# Patient Record
Sex: FEMALE | Race: WHITE | NOT HISPANIC OR LATINO
[De-identification: names, ages, dates, MRNs, and addresses within clinical notes are randomized per-mention and may not be internally consistent; named-entity substitution may affect disease eponyms.]

---

## 2017-01-04 ENCOUNTER — NON-APPOINTMENT (OUTPATIENT)
Age: 67
End: 2017-01-04

## 2017-01-04 ENCOUNTER — APPOINTMENT (OUTPATIENT)
Dept: CARDIOLOGY | Facility: CLINIC | Age: 67
End: 2017-01-04

## 2017-01-04 VITALS
WEIGHT: 160 LBS | BODY MASS INDEX: 24.33 KG/M2 | SYSTOLIC BLOOD PRESSURE: 137 MMHG | OXYGEN SATURATION: 98 % | HEART RATE: 57 BPM | DIASTOLIC BLOOD PRESSURE: 85 MMHG

## 2017-01-04 DIAGNOSIS — Z78.9 OTHER SPECIFIED HEALTH STATUS: ICD-10-CM

## 2017-01-13 ENCOUNTER — RX RENEWAL (OUTPATIENT)
Age: 67
End: 2017-01-13

## 2017-02-06 ENCOUNTER — MEDICATION RENEWAL (OUTPATIENT)
Age: 67
End: 2017-02-06

## 2017-08-07 ENCOUNTER — MEDICATION RENEWAL (OUTPATIENT)
Age: 67
End: 2017-08-07

## 2018-05-09 ENCOUNTER — NON-APPOINTMENT (OUTPATIENT)
Age: 68
End: 2018-05-09

## 2018-05-09 ENCOUNTER — APPOINTMENT (OUTPATIENT)
Dept: CARDIOLOGY | Facility: CLINIC | Age: 68
End: 2018-05-09
Payer: MEDICARE

## 2018-05-09 VITALS
BODY MASS INDEX: 24.71 KG/M2 | HEART RATE: 64 BPM | SYSTOLIC BLOOD PRESSURE: 112 MMHG | HEIGHT: 68 IN | DIASTOLIC BLOOD PRESSURE: 60 MMHG | WEIGHT: 163 LBS

## 2018-05-09 DIAGNOSIS — Z00.00 ENCOUNTER FOR GENERAL ADULT MEDICAL EXAMINATION W/OUT ABNORMAL FINDINGS: ICD-10-CM

## 2018-05-09 PROCEDURE — 93000 ELECTROCARDIOGRAM COMPLETE: CPT

## 2018-05-09 PROCEDURE — 99215 OFFICE O/P EST HI 40 MIN: CPT

## 2018-08-13 ENCOUNTER — APPOINTMENT (OUTPATIENT)
Dept: SURGICAL ONCOLOGY | Facility: CLINIC | Age: 68
End: 2018-08-13
Payer: MEDICARE

## 2018-08-13 VITALS
WEIGHT: 163 LBS | OXYGEN SATURATION: 95 % | HEIGHT: 68 IN | HEART RATE: 60 BPM | DIASTOLIC BLOOD PRESSURE: 83 MMHG | BODY MASS INDEX: 24.71 KG/M2 | SYSTOLIC BLOOD PRESSURE: 169 MMHG

## 2018-08-13 PROCEDURE — 99204 OFFICE O/P NEW MOD 45 MIN: CPT

## 2018-08-17 ENCOUNTER — OUTPATIENT (OUTPATIENT)
Dept: OUTPATIENT SERVICES | Facility: HOSPITAL | Age: 68
LOS: 1 days | End: 2018-08-17
Payer: COMMERCIAL

## 2018-08-17 DIAGNOSIS — C43.9 MALIGNANT MELANOMA OF SKIN, UNSPECIFIED: ICD-10-CM

## 2018-08-20 ENCOUNTER — RESULT REVIEW (OUTPATIENT)
Age: 68
End: 2018-08-20

## 2018-08-20 PROCEDURE — 88321 CONSLTJ&REPRT SLD PREP ELSWR: CPT

## 2018-08-27 ENCOUNTER — RX RENEWAL (OUTPATIENT)
Age: 68
End: 2018-08-27

## 2018-09-14 ENCOUNTER — APPOINTMENT (OUTPATIENT)
Dept: PLASTIC SURGERY | Facility: CLINIC | Age: 68
End: 2018-09-14
Payer: MEDICARE

## 2018-09-14 VITALS — WEIGHT: 165 LBS | HEIGHT: 68 IN | BODY MASS INDEX: 25.01 KG/M2

## 2018-09-14 DIAGNOSIS — Z78.9 OTHER SPECIFIED HEALTH STATUS: ICD-10-CM

## 2018-09-14 DIAGNOSIS — I51.9 HEART DISEASE, UNSPECIFIED: ICD-10-CM

## 2018-09-14 DIAGNOSIS — Z85.820 PERSONAL HISTORY OF MALIGNANT MELANOMA OF SKIN: ICD-10-CM

## 2018-09-14 PROCEDURE — 99204 OFFICE O/P NEW MOD 45 MIN: CPT

## 2018-09-17 PROBLEM — Z85.820 HISTORY OF MELANOMA: Status: RESOLVED | Noted: 2018-08-13 | Resolved: 2018-09-17

## 2018-09-17 PROBLEM — Z78.9 CURRENT NON-SMOKER: Status: ACTIVE | Noted: 2018-08-13

## 2018-09-17 PROBLEM — I51.9 HEART PROBLEM: Status: RESOLVED | Noted: 2018-08-13 | Resolved: 2018-09-17

## 2018-09-20 ENCOUNTER — FORM ENCOUNTER (OUTPATIENT)
Age: 68
End: 2018-09-20

## 2018-09-21 ENCOUNTER — OUTPATIENT (OUTPATIENT)
Dept: OUTPATIENT SERVICES | Facility: HOSPITAL | Age: 68
LOS: 1 days | End: 2018-09-21
Payer: MEDICARE

## 2018-09-21 VITALS
RESPIRATION RATE: 18 BRPM | OXYGEN SATURATION: 99 % | WEIGHT: 162.92 LBS | TEMPERATURE: 98 F | SYSTOLIC BLOOD PRESSURE: 130 MMHG | HEART RATE: 70 BPM | HEIGHT: 68 IN | DIASTOLIC BLOOD PRESSURE: 70 MMHG

## 2018-09-21 DIAGNOSIS — D03.9 MELANOMA IN SITU, UNSPECIFIED: ICD-10-CM

## 2018-09-21 DIAGNOSIS — D03.71 MELANOMA IN SITU OF RIGHT LOWER LIMB, INCLUDING HIP: ICD-10-CM

## 2018-09-21 DIAGNOSIS — I48.0 PAROXYSMAL ATRIAL FIBRILLATION: ICD-10-CM

## 2018-09-21 DIAGNOSIS — Z01.818 ENCOUNTER FOR OTHER PREPROCEDURAL EXAMINATION: ICD-10-CM

## 2018-09-21 LAB
ANION GAP SERPL CALC-SCNC: 11 MMOL/L — SIGNIFICANT CHANGE UP (ref 5–17)
BUN SERPL-MCNC: 18 MG/DL — SIGNIFICANT CHANGE UP (ref 7–23)
CALCIUM SERPL-MCNC: 9.8 MG/DL — SIGNIFICANT CHANGE UP (ref 8.4–10.5)
CHLORIDE SERPL-SCNC: 100 MMOL/L — SIGNIFICANT CHANGE UP (ref 96–108)
CO2 SERPL-SCNC: 27 MMOL/L — SIGNIFICANT CHANGE UP (ref 22–31)
CREAT SERPL-MCNC: 0.81 MG/DL — SIGNIFICANT CHANGE UP (ref 0.5–1.3)
GLUCOSE SERPL-MCNC: 97 MG/DL — SIGNIFICANT CHANGE UP (ref 70–99)
HCT VFR BLD CALC: 43.1 % — SIGNIFICANT CHANGE UP (ref 34.5–45)
HGB BLD-MCNC: 13.4 G/DL — SIGNIFICANT CHANGE UP (ref 11.5–15.5)
MCHC RBC-ENTMCNC: 30.2 PG — SIGNIFICANT CHANGE UP (ref 27–34)
MCHC RBC-ENTMCNC: 31.1 GM/DL — LOW (ref 32–36)
MCV RBC AUTO: 97.1 FL — SIGNIFICANT CHANGE UP (ref 80–100)
PLATELET # BLD AUTO: 311 K/UL — SIGNIFICANT CHANGE UP (ref 150–400)
POTASSIUM SERPL-MCNC: 4.2 MMOL/L — SIGNIFICANT CHANGE UP (ref 3.5–5.3)
POTASSIUM SERPL-SCNC: 4.2 MMOL/L — SIGNIFICANT CHANGE UP (ref 3.5–5.3)
RBC # BLD: 4.44 M/UL — SIGNIFICANT CHANGE UP (ref 3.8–5.2)
RBC # FLD: 13.9 % — SIGNIFICANT CHANGE UP (ref 10.3–14.5)
SODIUM SERPL-SCNC: 138 MMOL/L — SIGNIFICANT CHANGE UP (ref 135–145)
WBC # BLD: 7.29 K/UL — SIGNIFICANT CHANGE UP (ref 3.8–10.5)
WBC # FLD AUTO: 7.29 K/UL — SIGNIFICANT CHANGE UP (ref 3.8–10.5)

## 2018-09-21 PROCEDURE — G0463: CPT

## 2018-09-21 PROCEDURE — 80048 BASIC METABOLIC PNL TOTAL CA: CPT

## 2018-09-21 PROCEDURE — 71046 X-RAY EXAM CHEST 2 VIEWS: CPT

## 2018-09-21 PROCEDURE — 85027 COMPLETE CBC AUTOMATED: CPT

## 2018-09-21 PROCEDURE — 71046 X-RAY EXAM CHEST 2 VIEWS: CPT | Mod: 26

## 2018-09-21 RX ORDER — LIDOCAINE HCL 20 MG/ML
0.2 VIAL (ML) INJECTION ONCE
Qty: 0 | Refills: 0 | Status: DISCONTINUED | OUTPATIENT
Start: 2018-10-01 | End: 2018-10-16

## 2018-09-21 RX ORDER — SODIUM CHLORIDE 9 MG/ML
3 INJECTION INTRAMUSCULAR; INTRAVENOUS; SUBCUTANEOUS EVERY 8 HOURS
Qty: 0 | Refills: 0 | Status: DISCONTINUED | OUTPATIENT
Start: 2018-10-01 | End: 2018-10-16

## 2018-09-21 NOTE — H&P PST ADULT - HISTORY OF PRESENT ILLNESS
This is a 67 y/o female This is a 67 y/o female c/o a mole in right shin, seen dermatologist biopsy + melanoma, she presents today for wide excision melanoma right shin, plastics, reconstruction of right shin wound possible local flap, possible skin graft

## 2018-09-21 NOTE — H&P PST ADULT - PROBLEM SELECTOR PLAN 1
wide excision melanoma right shin  plastics  reconstruction of right shin wound   possible local flap  possible skin graft

## 2018-09-21 NOTE — H&P PST ADULT - NSANTHOSAYNRD_GEN_A_CORE
No. SOUMYA screening performed.  STOP BANG Legend: 0-2 = LOW Risk; 3-4 = INTERMEDIATE Risk; 5-8 = HIGH Risk

## 2018-09-21 NOTE — H&P PST ADULT - PMH
Diverticula, colon    H/O irritable bowel syndrome    H/O mitral valve prolapse  echo 2012  Paroxysmal A-fib  2012 went to ER, treated with medication, discharged home  on metoprolol since 2012, afib resolved Diverticula, colon    H/O irritable bowel syndrome    H/O mitral valve prolapse  echo 2012  Paroxysmal A-fib  2012 went to ER, treated with medication, discharged home  on metoprolol since 2012, no recurrence

## 2018-09-21 NOTE — H&P PST ADULT - ATTENDING COMMENTS
68-year-old lady recently diagnosed with a melanoma in situ of her right shin, it will for appropriate excision with obstruction by Dr. Schneider.    Diagnosis and surgical management were reviewed with her in my office any morning of operation.    All questions, consent on chart

## 2018-10-01 ENCOUNTER — RESULT REVIEW (OUTPATIENT)
Age: 68
End: 2018-10-01

## 2018-10-01 ENCOUNTER — OUTPATIENT (OUTPATIENT)
Dept: OUTPATIENT SERVICES | Facility: HOSPITAL | Age: 68
LOS: 1 days | End: 2018-10-01
Payer: MEDICARE

## 2018-10-01 ENCOUNTER — APPOINTMENT (OUTPATIENT)
Dept: SURGICAL ONCOLOGY | Facility: HOSPITAL | Age: 68
End: 2018-10-01

## 2018-10-01 VITALS
DIASTOLIC BLOOD PRESSURE: 72 MMHG | HEART RATE: 53 BPM | RESPIRATION RATE: 18 BRPM | TEMPERATURE: 98 F | WEIGHT: 162.92 LBS | HEIGHT: 68 IN | OXYGEN SATURATION: 98 % | SYSTOLIC BLOOD PRESSURE: 150 MMHG

## 2018-10-01 VITALS
HEART RATE: 58 BPM | OXYGEN SATURATION: 99 % | SYSTOLIC BLOOD PRESSURE: 131 MMHG | RESPIRATION RATE: 18 BRPM | TEMPERATURE: 98 F | DIASTOLIC BLOOD PRESSURE: 68 MMHG

## 2018-10-01 DIAGNOSIS — D03.71 MELANOMA IN SITU OF RIGHT LOWER LIMB, INCLUDING HIP: ICD-10-CM

## 2018-10-01 PROCEDURE — 11603 EXC TR-EXT MAL+MARG 2.1-3 CM: CPT

## 2018-10-01 PROCEDURE — 15220 FTH/GFT FR S/A/L 20 SQ CM/<: CPT

## 2018-10-01 PROCEDURE — 27632 EXC LEG/ANKLE LES SC 3 CM/>: CPT | Mod: RT

## 2018-10-01 PROCEDURE — 88305 TISSUE EXAM BY PATHOLOGIST: CPT

## 2018-10-01 PROCEDURE — 88342 IMHCHEM/IMCYTCHM 1ST ANTB: CPT

## 2018-10-01 PROCEDURE — 88342 IMHCHEM/IMCYTCHM 1ST ANTB: CPT | Mod: 26

## 2018-10-01 PROCEDURE — 88305 TISSUE EXAM BY PATHOLOGIST: CPT | Mod: 26

## 2018-10-01 RX ORDER — SODIUM CHLORIDE 9 MG/ML
1000 INJECTION, SOLUTION INTRAVENOUS
Qty: 0 | Refills: 0 | Status: DISCONTINUED | OUTPATIENT
Start: 2018-10-01 | End: 2018-10-16

## 2018-10-01 RX ORDER — ONDANSETRON 8 MG/1
4 TABLET, FILM COATED ORAL ONCE
Qty: 0 | Refills: 0 | Status: DISCONTINUED | OUTPATIENT
Start: 2018-10-01 | End: 2018-10-16

## 2018-10-01 RX ORDER — CELECOXIB 200 MG/1
200 CAPSULE ORAL ONCE
Qty: 0 | Refills: 0 | Status: DISCONTINUED | OUTPATIENT
Start: 2018-10-01 | End: 2018-10-16

## 2018-10-01 RX ORDER — ACETAMINOPHEN 500 MG
1000 TABLET ORAL ONCE
Qty: 0 | Refills: 0 | Status: COMPLETED | OUTPATIENT
Start: 2018-10-01 | End: 2018-10-01

## 2018-10-01 RX ORDER — CEPHALEXIN 500 MG
1 CAPSULE ORAL
Qty: 18 | Refills: 0
Start: 2018-10-01 | End: 2018-10-06

## 2018-10-01 RX ORDER — CELECOXIB 200 MG/1
200 CAPSULE ORAL ONCE
Qty: 0 | Refills: 0 | Status: COMPLETED | OUTPATIENT
Start: 2018-10-01 | End: 2018-10-01

## 2018-10-01 RX ADMIN — CELECOXIB 200 MILLIGRAM(S): 200 CAPSULE ORAL at 06:05

## 2018-10-01 RX ADMIN — Medication 1000 MILLIGRAM(S): at 06:05

## 2018-10-01 NOTE — BRIEF OPERATIVE NOTE - OPERATION/FINDINGS
Minimum 5 mm margin
right lower shin wound s/p excision of melanoma  left groin wound closed w/ 3-0 vicryl, 3-0 mono, dermabond + steris  right lower shin closed with bolster dressing (4-0 chromic & 2-0 silk)

## 2018-10-01 NOTE — ASU DISCHARGE PLAN (ADULT/PEDIATRIC). - NOTIFY
Fever greater than 101/Numbness, tingling/Increased Irritability or Sluggishness/Bleeding that does not stop/Inability to Tolerate Liquids or Foods/Pain not relieved by Medications/Numbness, color, or temperature change to extremity/Excessive Diarrhea/Unable to Urinate/Swelling that continues/Persistent Nausea and Vomiting

## 2018-10-01 NOTE — ASU DISCHARGE PLAN (ADULT/PEDIATRIC). - ACTIVITY LEVEL
no heavy lifting/elevate extremity/no sports/gym/no exercise sleep with the right leg elevated on 1-2 pillows to reduce swelling/no sports/gym/elevate extremity/no exercise/no heavy lifting

## 2018-10-01 NOTE — ASU DISCHARGE PLAN (ADULT/PEDIATRIC). - BATHING
Keep the right leg DRY until you see Dr. Schneider cover it with a plastic bag if you are showering/shower only

## 2018-10-01 NOTE — ASU DISCHARGE PLAN (ADULT/PEDIATRIC). - NURSING INSTRUCTIONS
Tylenol/Motrin as needed for mild pain;  next doses OK @ 12:00pm.  Percocet as prescribed for stronger pain, dose OK @ 12:00pm.  Elevate right leg on 1-2 pillows while resting or sleeping.  Follow up with MDs as requested, call office for appt.  You may resume any/all preop medications/supplements as before.

## 2018-10-01 NOTE — ASU DISCHARGE PLAN (ADULT/PEDIATRIC). - MEDICATION SUMMARY - MEDICATIONS TO TAKE
I will START or STAY ON the medications listed below when I get home from the hospital:    Percocet 5/325 oral tablet  -- 1 tab(s) by mouth every 6 hours, As Needed -for severe pain MDD:4 tabs   -- Caution federal law prohibits the transfer of this drug to any person other  than the person for whom it was prescribed.  May cause drowsiness.  Alcohol may intensify this effect.  Use care when operating dangerous machinery.  This prescription cannot be refilled.  This product contains acetaminophen.  Do not use  with any other product containing acetaminophen to prevent possible liver damage.  Using more of this medication than prescribed may cause serious breathing problems.    -- Indication: For for severe pain following surgery    aspirin 81 mg oral tablet  -- 1 tab(s) by mouth once a day, continue ASA pre op  -- Indication: For home medications    simvastatin 20 mg oral tablet  -- 1 tab(s) by mouth once a day (at bedtime)  -- Indication: For home medications    metoprolol succinate 25 mg oral tablet, extended release  -- 0.5 tab(s) by mouth once a day  -- Indication: For home medications    Keflex 500 mg oral capsule  -- 1 cap(s) by mouth 3 times a day MDD:3 capsules  -- Finish all this medication unless otherwise directed by prescriber.    -- Indication: For after surgery antibiotics take 3x a day with food and water

## 2018-10-01 NOTE — BRIEF OPERATIVE NOTE - PROCEDURE
<<-----Click on this checkbox to enter Procedure Skin graft of lower extremity  10/01/2018  FTSG from the left groin to reconstruct right leg wound s/p wide excision of melanoma in situ.  Active  BKWON

## 2018-10-01 NOTE — ASU PATIENT PROFILE, ADULT - PMH
Diverticula, colon    H/O irritable bowel syndrome    H/O mitral valve prolapse  echo 2012  Paroxysmal A-fib  2012 went to ER, treated with medication, discharged home  on metoprolol since 2012, no recurrence

## 2018-10-01 NOTE — ASU DISCHARGE PLAN (ADULT/PEDIATRIC). - ITEMS TO FOLLOWUP WITH YOUR PHYSICIAN'S
Initial followup is with plastic surgery in the next 7-10 days.    Dr. Mayes should call with pathology report by October 10, the call will determine subsequent management Follow up with Dr. Schneider next week WEDNESDAY 10/10/18.  Please call for an appt: [phone].    Dr. Mayes should call with pathology report by October 10, the call will determine subsequent management

## 2018-10-01 NOTE — BRIEF OPERATIVE NOTE - POST-OP DX
Melanoma in situ of right lower leg  10/01/2018    Active  Brayan Mayes
Melanoma in situ of right lower leg  10/01/2018    Active  Brayan Mayes

## 2018-10-01 NOTE — ASU DISCHARGE PLAN (ADULT/PEDIATRIC). - SPECIAL INSTRUCTIONS
Keep the right leg wound DRY.  The yellow dressing is called a bolster dressings - DO NOT REMOVE. We will remove it next office visit.  You may unwrap the ACE wrapping to shower; keep it loosely wrapped around the leg when ambulating or   standing to reduce swelling.  Sleep with the leg elevated and keep it elevated as much as you can to reduce swelling.    Take medications as directed, you may take and Advil or Motrin for mild pain.

## 2018-10-01 NOTE — BRIEF OPERATIVE NOTE - PROCEDURE
<<-----Click on this checkbox to enter Procedure Melanoma excision  10/01/2018  Excision of in situ melanoma from right chin with reconstruction by Dr. Jennie GIMENEZ

## 2018-10-08 LAB — SURGICAL PATHOLOGY STUDY: SIGNIFICANT CHANGE UP

## 2018-10-10 ENCOUNTER — APPOINTMENT (OUTPATIENT)
Dept: PLASTIC SURGERY | Facility: CLINIC | Age: 68
End: 2018-10-10
Payer: MEDICARE

## 2018-10-10 VITALS
WEIGHT: 165 LBS | SYSTOLIC BLOOD PRESSURE: 143 MMHG | HEIGHT: 68 IN | BODY MASS INDEX: 25.01 KG/M2 | HEART RATE: 62 BPM | DIASTOLIC BLOOD PRESSURE: 81 MMHG | RESPIRATION RATE: 16 BRPM | OXYGEN SATURATION: 97 %

## 2018-10-10 PROCEDURE — 99024 POSTOP FOLLOW-UP VISIT: CPT

## 2018-10-19 ENCOUNTER — APPOINTMENT (OUTPATIENT)
Dept: PLASTIC SURGERY | Facility: CLINIC | Age: 68
End: 2018-10-19
Payer: MEDICARE

## 2018-10-19 PROCEDURE — 99024 POSTOP FOLLOW-UP VISIT: CPT

## 2018-11-13 ENCOUNTER — APPOINTMENT (OUTPATIENT)
Dept: ORTHOPEDIC SURGERY | Facility: CLINIC | Age: 68
End: 2018-11-13
Payer: MEDICARE

## 2018-11-13 VITALS
WEIGHT: 165 LBS | BODY MASS INDEX: 25.01 KG/M2 | DIASTOLIC BLOOD PRESSURE: 73 MMHG | HEART RATE: 66 BPM | SYSTOLIC BLOOD PRESSURE: 137 MMHG | HEIGHT: 68 IN

## 2018-11-13 PROCEDURE — 99204 OFFICE O/P NEW MOD 45 MIN: CPT | Mod: 25

## 2018-11-13 PROCEDURE — 73564 X-RAY EXAM KNEE 4 OR MORE: CPT | Mod: LT

## 2018-11-13 PROCEDURE — 20610 DRAIN/INJ JOINT/BURSA W/O US: CPT | Mod: LT

## 2018-11-13 RX ADMIN — METHYLPREDNISOLONE ACETATE 2 MG/ML: 40 INJECTION, SUSPENSION INTRALESIONAL; INTRAMUSCULAR; INTRASYNOVIAL; SOFT TISSUE at 00:00

## 2018-11-13 RX ADMIN — LIDOCAINE HYDROCHLORIDE 3 %: 10 INJECTION, SOLUTION EPIDURAL; INFILTRATION; INTRACAUDAL; PERINEURAL at 00:00

## 2018-11-16 ENCOUNTER — APPOINTMENT (OUTPATIENT)
Dept: PLASTIC SURGERY | Facility: CLINIC | Age: 68
End: 2018-11-16
Payer: MEDICARE

## 2018-11-16 PROCEDURE — 99024 POSTOP FOLLOW-UP VISIT: CPT

## 2018-11-19 RX ORDER — UBIDECARENONE/VIT E ACET 100MG-5
CAPSULE ORAL
Refills: 0 | Status: ACTIVE | COMMUNITY

## 2018-11-19 RX ORDER — MULTIVITAMIN
TABLET ORAL
Refills: 0 | Status: ACTIVE | COMMUNITY

## 2018-12-17 ENCOUNTER — APPOINTMENT (OUTPATIENT)
Dept: ORTHOPEDIC SURGERY | Facility: CLINIC | Age: 68
End: 2018-12-17
Payer: MEDICARE

## 2018-12-17 VITALS
SYSTOLIC BLOOD PRESSURE: 154 MMHG | BODY MASS INDEX: 25.16 KG/M2 | WEIGHT: 166 LBS | HEART RATE: 57 BPM | HEIGHT: 68 IN | DIASTOLIC BLOOD PRESSURE: 79 MMHG

## 2018-12-17 DIAGNOSIS — M17.12 UNILATERAL PRIMARY OSTEOARTHRITIS, LEFT KNEE: ICD-10-CM

## 2018-12-17 PROCEDURE — 99213 OFFICE O/P EST LOW 20 MIN: CPT

## 2018-12-19 PROBLEM — M17.12 PRIMARY OSTEOARTHRITIS OF LEFT KNEE: Status: ACTIVE | Noted: 2018-11-13

## 2018-12-20 RX ORDER — METHYLPRED ACET/NACL,ISO-OS/PF 40 MG/ML
40 VIAL (ML) INJECTION
Qty: 1 | Refills: 0 | Status: COMPLETED | OUTPATIENT
Start: 2018-11-13

## 2018-12-20 RX ORDER — LIDOCAINE HYDROCHLORIDE 10 MG/ML
1 INJECTION, SOLUTION INFILTRATION; PERINEURAL
Refills: 0 | Status: COMPLETED | OUTPATIENT
Start: 2018-11-13

## 2018-12-28 ENCOUNTER — APPOINTMENT (OUTPATIENT)
Dept: PLASTIC SURGERY | Facility: CLINIC | Age: 68
End: 2018-12-28
Payer: MEDICARE

## 2018-12-28 DIAGNOSIS — D03.71 MELANOMA IN SITU OF RIGHT LOWER LIMB, INCLUDING HIP: ICD-10-CM

## 2018-12-28 PROCEDURE — 99024 POSTOP FOLLOW-UP VISIT: CPT

## 2018-12-29 PROBLEM — D03.71 MELANOMA IN SITU OF RIGHT LOWER LEG: Status: ACTIVE | Noted: 2018-08-12

## 2019-08-28 ENCOUNTER — NON-APPOINTMENT (OUTPATIENT)
Age: 69
End: 2019-08-28

## 2019-08-28 ENCOUNTER — APPOINTMENT (OUTPATIENT)
Dept: CARDIOLOGY | Facility: CLINIC | Age: 69
End: 2019-08-28
Payer: MEDICARE

## 2019-08-28 VITALS — OXYGEN SATURATION: 97 % | BODY MASS INDEX: 25.46 KG/M2 | WEIGHT: 168 LBS | HEART RATE: 65 BPM | HEIGHT: 68 IN

## 2019-08-28 DIAGNOSIS — R94.39 ABNORMAL RESULT OF OTHER CARDIOVASCULAR FUNCTION STUDY: ICD-10-CM

## 2019-08-28 DIAGNOSIS — R06.02 SHORTNESS OF BREATH: ICD-10-CM

## 2019-08-28 PROCEDURE — 93000 ELECTROCARDIOGRAM COMPLETE: CPT

## 2019-08-28 PROCEDURE — 99205 OFFICE O/P NEW HI 60 MIN: CPT

## 2019-09-08 LAB
ALBUMIN SERPL ELPH-MCNC: 4.4 G/DL
ALP BLD-CCNC: 98 U/L
ALT SERPL-CCNC: 26 U/L
ANION GAP SERPL CALC-SCNC: 10 MMOL/L
AST SERPL-CCNC: 25 U/L
BASOPHILS # BLD AUTO: 0.08 K/UL
BASOPHILS NFR BLD AUTO: 1.3 %
BILIRUB SERPL-MCNC: 0.3 MG/DL
BUN SERPL-MCNC: 17 MG/DL
CALCIUM SERPL-MCNC: 9.4 MG/DL
CHLORIDE SERPL-SCNC: 106 MMOL/L
CHOLEST SERPL-MCNC: 217 MG/DL
CHOLEST/HDLC SERPL: 2.5 RATIO
CO2 SERPL-SCNC: 27 MMOL/L
CREAT SERPL-MCNC: 0.83 MG/DL
EOSINOPHIL # BLD AUTO: 0.21 K/UL
EOSINOPHIL NFR BLD AUTO: 3.4 %
ESTIMATED AVERAGE GLUCOSE: 117 MG/DL
GLUCOSE SERPL-MCNC: 113 MG/DL
HBA1C MFR BLD HPLC: 5.7 %
HCT VFR BLD CALC: 42.1 %
HDLC SERPL-MCNC: 87 MG/DL
HGB BLD-MCNC: 13.4 G/DL
IMM GRANULOCYTES NFR BLD AUTO: 0.3 %
LDLC SERPL CALC-MCNC: 104 MG/DL
LYMPHOCYTES # BLD AUTO: 2.11 K/UL
LYMPHOCYTES NFR BLD AUTO: 34.5 %
MAN DIFF?: NORMAL
MCHC RBC-ENTMCNC: 30.9 PG
MCHC RBC-ENTMCNC: 31.8 GM/DL
MCV RBC AUTO: 97.2 FL
MONOCYTES # BLD AUTO: 0.59 K/UL
MONOCYTES NFR BLD AUTO: 9.7 %
NEUTROPHILS # BLD AUTO: 3.1 K/UL
NEUTROPHILS NFR BLD AUTO: 50.8 %
NT-PROBNP SERPL-MCNC: 172 PG/ML
PLATELET # BLD AUTO: 285 K/UL
POTASSIUM SERPL-SCNC: 5.3 MMOL/L
PROT SERPL-MCNC: 6.5 G/DL
RBC # BLD: 4.33 M/UL
RBC # FLD: 13.5 %
SODIUM SERPL-SCNC: 143 MMOL/L
TRIGL SERPL-MCNC: 132 MG/DL
TSH SERPL-ACNC: 1.19 UIU/ML
WBC # FLD AUTO: 6.11 K/UL

## 2019-09-12 ENCOUNTER — APPOINTMENT (OUTPATIENT)
Dept: CV DIAGNOSTICS | Facility: HOSPITAL | Age: 69
End: 2019-09-12

## 2019-09-12 ENCOUNTER — OUTPATIENT (OUTPATIENT)
Dept: OUTPATIENT SERVICES | Facility: HOSPITAL | Age: 69
LOS: 1 days | End: 2019-09-12
Payer: MEDICARE

## 2019-09-12 DIAGNOSIS — I48.0 PAROXYSMAL ATRIAL FIBRILLATION: ICD-10-CM

## 2019-09-12 PROBLEM — R94.39 POSITIVE CARDIAC STRESS TEST: Status: ACTIVE | Noted: 2019-09-12

## 2019-09-12 PROBLEM — R06.02 EXERTIONAL SHORTNESS OF BREATH: Status: ACTIVE | Noted: 2019-08-28

## 2019-09-12 PROCEDURE — 78452 HT MUSCLE IMAGE SPECT MULT: CPT | Mod: 26

## 2019-09-12 PROCEDURE — 78452 HT MUSCLE IMAGE SPECT MULT: CPT

## 2019-09-12 PROCEDURE — A9500: CPT

## 2019-09-12 PROCEDURE — 93018 CV STRESS TEST I&R ONLY: CPT

## 2019-09-12 PROCEDURE — 93016 CV STRESS TEST SUPVJ ONLY: CPT

## 2019-09-12 PROCEDURE — 93017 CV STRESS TEST TRACING ONLY: CPT

## 2019-09-13 ENCOUNTER — TRANSCRIPTION ENCOUNTER (OUTPATIENT)
Age: 69
End: 2019-09-13

## 2019-10-01 ENCOUNTER — APPOINTMENT (OUTPATIENT)
Dept: CARDIOLOGY | Facility: CLINIC | Age: 69
End: 2019-10-01

## 2019-10-07 ENCOUNTER — OUTPATIENT (OUTPATIENT)
Dept: OUTPATIENT SERVICES | Facility: HOSPITAL | Age: 69
LOS: 1 days | End: 2019-10-07
Payer: MEDICARE

## 2019-10-07 VITALS
OXYGEN SATURATION: 98 % | RESPIRATION RATE: 14 BRPM | HEIGHT: 68 IN | DIASTOLIC BLOOD PRESSURE: 77 MMHG | HEART RATE: 57 BPM | SYSTOLIC BLOOD PRESSURE: 170 MMHG | TEMPERATURE: 99 F | WEIGHT: 167.99 LBS

## 2019-10-07 DIAGNOSIS — R94.39 ABNORMAL RESULT OF OTHER CARDIOVASCULAR FUNCTION STUDY: ICD-10-CM

## 2019-10-07 DIAGNOSIS — D03.9 MELANOMA IN SITU, UNSPECIFIED: Chronic | ICD-10-CM

## 2019-10-07 LAB
ALBUMIN SERPL ELPH-MCNC: 4.4 G/DL — SIGNIFICANT CHANGE UP (ref 3.3–5)
ALP SERPL-CCNC: 96 U/L — SIGNIFICANT CHANGE UP (ref 40–120)
ALT FLD-CCNC: 22 U/L — SIGNIFICANT CHANGE UP (ref 10–45)
ANION GAP SERPL CALC-SCNC: 11 MMOL/L — SIGNIFICANT CHANGE UP (ref 5–17)
AST SERPL-CCNC: 21 U/L — SIGNIFICANT CHANGE UP (ref 10–40)
BILIRUB SERPL-MCNC: 0.3 MG/DL — SIGNIFICANT CHANGE UP (ref 0.2–1.2)
BUN SERPL-MCNC: 14 MG/DL — SIGNIFICANT CHANGE UP (ref 7–23)
CALCIUM SERPL-MCNC: 8.9 MG/DL — SIGNIFICANT CHANGE UP (ref 8.4–10.5)
CHLORIDE SERPL-SCNC: 104 MMOL/L — SIGNIFICANT CHANGE UP (ref 96–108)
CO2 SERPL-SCNC: 28 MMOL/L — SIGNIFICANT CHANGE UP (ref 22–31)
CREAT SERPL-MCNC: 0.86 MG/DL — SIGNIFICANT CHANGE UP (ref 0.5–1.3)
GLUCOSE SERPL-MCNC: 111 MG/DL — HIGH (ref 70–99)
HCT VFR BLD CALC: 41.2 % — SIGNIFICANT CHANGE UP (ref 34.5–45)
HGB BLD-MCNC: 13.6 G/DL — SIGNIFICANT CHANGE UP (ref 11.5–15.5)
MCHC RBC-ENTMCNC: 31.6 PG — SIGNIFICANT CHANGE UP (ref 27–34)
MCHC RBC-ENTMCNC: 33 GM/DL — SIGNIFICANT CHANGE UP (ref 32–36)
MCV RBC AUTO: 95.8 FL — SIGNIFICANT CHANGE UP (ref 80–100)
NRBC # BLD: 0 /100 WBCS — SIGNIFICANT CHANGE UP (ref 0–0)
PLATELET # BLD AUTO: 294 K/UL — SIGNIFICANT CHANGE UP (ref 150–400)
POTASSIUM SERPL-MCNC: 4.2 MMOL/L — SIGNIFICANT CHANGE UP (ref 3.5–5.3)
POTASSIUM SERPL-SCNC: 4.2 MMOL/L — SIGNIFICANT CHANGE UP (ref 3.5–5.3)
PROT SERPL-MCNC: 7 G/DL — SIGNIFICANT CHANGE UP (ref 6–8.3)
RBC # BLD: 4.3 M/UL — SIGNIFICANT CHANGE UP (ref 3.8–5.2)
RBC # FLD: 13.7 % — SIGNIFICANT CHANGE UP (ref 10.3–14.5)
SODIUM SERPL-SCNC: 143 MMOL/L — SIGNIFICANT CHANGE UP (ref 135–145)
WBC # BLD: 8.68 K/UL — SIGNIFICANT CHANGE UP (ref 3.8–10.5)
WBC # FLD AUTO: 8.68 K/UL — SIGNIFICANT CHANGE UP (ref 3.8–10.5)

## 2019-10-07 PROCEDURE — 93010 ELECTROCARDIOGRAM REPORT: CPT

## 2019-10-07 PROCEDURE — 80053 COMPREHEN METABOLIC PANEL: CPT

## 2019-10-07 PROCEDURE — C1769: CPT

## 2019-10-07 PROCEDURE — C1887: CPT

## 2019-10-07 PROCEDURE — 93458 L HRT ARTERY/VENTRICLE ANGIO: CPT | Mod: 26,GC

## 2019-10-07 PROCEDURE — 93458 L HRT ARTERY/VENTRICLE ANGIO: CPT

## 2019-10-07 PROCEDURE — C1894: CPT

## 2019-10-07 PROCEDURE — 93005 ELECTROCARDIOGRAM TRACING: CPT

## 2019-10-07 PROCEDURE — 85027 COMPLETE CBC AUTOMATED: CPT

## 2019-10-07 RX ORDER — SODIUM CHLORIDE 9 MG/ML
3 INJECTION INTRAMUSCULAR; INTRAVENOUS; SUBCUTANEOUS EVERY 8 HOURS
Refills: 0 | Status: DISCONTINUED | OUTPATIENT
Start: 2019-10-07 | End: 2019-10-22

## 2019-10-07 NOTE — H&P CARDIOLOGY - HISTORY OF PRESENT ILLNESS
68 yo  female (denies any implantable devices) with PMH of HTN, HLD, Afib (no NOAC, no reoccurrence in 6 months) presents today for cardiac angiogram. Patient reports exertional dyspnea assocaited with palpitations x . Patient was seen and evaluated by Dr. Hema Frazier (cards) recommended to have NST, completed on 9/12/19 revealing apical and inferior defects. Patient now here today for cardiac angiogram based on stress test findings for additional ischemic evaluation. Currently patient denies chest pain, palpitations SOB, PD, orthopnea. 68 yo  female (denies any implantable devices) with PMH of HTN, HLD, Afib (no NOAC, no reoccurrence in 6 months) presents today for cardiac angiogram. Patient reports exertional dyspnea associated with palpitations x . Patient was seen and evaluated by Dr. MARU Frazier (cards) recommended to have NST, completed on 9/12/19 revealing apical and inferior defects. Patient now here today for cardiac angiogram based on stress test findings for additional ischemic evaluation. Currently patient denies chest pain, palpitations SOB, PD, orthopnea. 70 yo  female (denies any implantable devices) with PMH of HTN, HLD, Afib (no NOAC, no reoccurrence in 6 months) presents today for cardiac angiogram. Patient reports exertional dyspnea associated with palpitations x 4-5 months . Patient was seen and evaluated by Dr. MARU Frazier (cards) recommended to have NST, completed on 9/12/19 revealing apical and inferior defects. Patient now here today for cardiac angiogram based on stress test findings for additional ischemic evaluation. Currently patient denies chest pain, palpitations SOB, PD, orthopnea.     < from: Nuclear Stress Test-Pharmacologic (09.12.19 @ 11:48) >  MPRESSIONS:Abnormal Study  * Chest Pain: No chest pain with administration of  Regadenoson.  * Symptom: Shortness of breath.  * HR Response: Appropriate.  * BP Response: Appropriate.  * Heart Rhythm: Sinus Rhythm - 82 BPM.  * Baseline ECG: Nonspecific ST-T wave abnormality.  * ECG Changes: During the inital attempt at exercise  stress, there were approximately 0.5 mm horizontal ST  segment depressions in leads V3-V6 starting at 3:00 min of  exercise at 82 bpm and persisting at least 4:50 min in  recovery following pharmacologic stress.  .  * Arrhythmia: Single APD occurred during pharmacologic  stress. Rare VPDs occurred during rest and pharmacologic  stress.  There were runs of supraventricular tachycardia  (likely PAT) during the initial attempt at exercise stress  and during recovery.  * The left ventricle was normal in size. There are  meidum-sized, mild to moderate defects in the mid to  distal anterior, distal anterolateral, apical, and distal  inferior walls that are mostly reversible suggestive of  ischemia with partial scarring.  * Post-stress gated wall motion analysis was performed  (LVEF = 68 %;LVEDV = 84 ml.) revealing mildly reduced  systolic thickening of the mid to distal anterior, distal  anterolateral, and apical walls.  Overall left ventricular  ejection fraction is normal.    < end of copied text >

## 2019-10-07 NOTE — H&P CARDIOLOGY - PSH
No significant past surgical history Melanoma in situ  skin graft of lower extremity  10/01/2018  FTSG from the left groin to reconstruct right leg wound s/p wide excision of melanoma in situ.

## 2019-10-07 NOTE — H&P CARDIOLOGY - PMH
Diverticula, colon    H/O irritable bowel syndrome    H/O mitral valve prolapse  echo 2012  Paroxysmal A-fib  2012 went to ER, treated with medication, discharged home  on metoprolol since 2012, no recurrence no NOAC Diverticula, colon    H/O irritable bowel syndrome    H/O mitral valve prolapse  echo 2012  Melanoma in situ    Paroxysmal A-fib  2012 went to ER, treated with medication, discharged home  on metoprolol since 2012, no recurrence no NOAC

## 2019-11-23 ENCOUNTER — TRANSCRIPTION ENCOUNTER (OUTPATIENT)
Age: 69
End: 2019-11-23

## 2020-07-02 NOTE — ASU PREOP CHECKLIST - WEIGHT IN KG
Administered By (Optional): Paris Wallace PA-C Concentration Of Kenalog Solution Injected (Mg/Ml): 40.0 Consent: The risks of atrophy were reviewed with the patient. Include Z78.9 (Other Specified Conditions Influencing Health Status) As An Associated Diagnosis?: No Medical Necessity Clause: This procedure was medically necessary because the lesions that were treated were: Detail Level: Detailed X Size Of Lesion In Cm (Optional): 0 Ndc# For Kenalog Only: 0003|[id number] Kenalog Preparation: Kenalog Total Volume (Ccs): 1.2 Treatment Number (Optional): 4 73.9

## 2020-07-30 PROBLEM — I48.0 PAROXYSMAL ATRIAL FIBRILLATION: Chronic | Status: ACTIVE | Noted: 2018-09-21

## 2020-07-30 PROBLEM — D03.9 MELANOMA IN SITU, UNSPECIFIED: Chronic | Status: ACTIVE | Noted: 2019-10-07

## 2020-09-02 ENCOUNTER — NON-APPOINTMENT (OUTPATIENT)
Age: 70
End: 2020-09-02

## 2020-09-02 ENCOUNTER — APPOINTMENT (OUTPATIENT)
Dept: CARDIOLOGY | Facility: CLINIC | Age: 70
End: 2020-09-02
Payer: MEDICARE

## 2020-09-02 VITALS
OXYGEN SATURATION: 99 % | HEART RATE: 60 BPM | BODY MASS INDEX: 24.33 KG/M2 | SYSTOLIC BLOOD PRESSURE: 171 MMHG | WEIGHT: 160 LBS | DIASTOLIC BLOOD PRESSURE: 78 MMHG

## 2020-09-02 PROCEDURE — 99215 OFFICE O/P EST HI 40 MIN: CPT

## 2020-09-02 PROCEDURE — 93000 ELECTROCARDIOGRAM COMPLETE: CPT

## 2020-09-02 NOTE — PHYSICAL EXAM
[General Appearance - Well Developed] : well developed [Normal Appearance] : normal appearance [Well Groomed] : well groomed [No Deformities] : no deformities [General Appearance - Well Nourished] : well nourished [General Appearance - In No Acute Distress] : no acute distress [Normal Conjunctiva] : the conjunctiva exhibited no abnormalities [Eyelids - No Xanthelasma] : the eyelids demonstrated no xanthelasmas [Normal Oral Mucosa] : normal oral mucosa [No Oral Pallor] : no oral pallor [No Oral Cyanosis] : no oral cyanosis [Normal Jugular Venous A Waves Present] : normal jugular venous A waves present [Normal Jugular Venous V Waves Present] : normal jugular venous V waves present [No Jugular Venous Echevarria A Waves] : no jugular venous echevarria A waves [Respiration, Rhythm And Depth] : normal respiratory rhythm and effort [Auscultation Breath Sounds / Voice Sounds] : lungs were clear to auscultation bilaterally [Exaggerated Use Of Accessory Muscles For Inspiration] : no accessory muscle use [Heart Rate And Rhythm] : heart rate and rhythm were normal [Heart Sounds] : normal S1 and S2 [Murmurs] : no murmurs present [Abdomen Soft] : soft [Abdomen Tenderness] : non-tender [Abdomen Mass (___ Cm)] : no abdominal mass palpated [Abnormal Walk] : normal gait [Gait - Sufficient For Exercise Testing] : the gait was sufficient for exercise testing [Nail Clubbing] : no clubbing of the fingernails [Cyanosis, Localized] : no localized cyanosis [Petechial Hemorrhages (___cm)] : no petechial hemorrhages [Skin Color & Pigmentation] : normal skin color and pigmentation [] : no rash [No Venous Stasis] : no venous stasis [Skin Lesions] : no skin lesions [No Skin Ulcers] : no skin ulcer [No Xanthoma] : no  xanthoma was observed [Affect] : the affect was normal [Oriented To Time, Place, And Person] : oriented to person, place, and time [Mood] : the mood was normal [No Anxiety] : not feeling anxious

## 2020-09-06 ENCOUNTER — APPOINTMENT (OUTPATIENT)
Dept: ULTRASOUND IMAGING | Facility: CLINIC | Age: 70
End: 2020-09-06
Payer: MEDICARE

## 2020-09-06 ENCOUNTER — RESULT REVIEW (OUTPATIENT)
Age: 70
End: 2020-09-06

## 2020-09-06 ENCOUNTER — OUTPATIENT (OUTPATIENT)
Dept: OUTPATIENT SERVICES | Facility: HOSPITAL | Age: 70
LOS: 1 days | End: 2020-09-06
Payer: MEDICARE

## 2020-09-06 DIAGNOSIS — I48.0 PAROXYSMAL ATRIAL FIBRILLATION: ICD-10-CM

## 2020-09-06 DIAGNOSIS — D03.9 MELANOMA IN SITU, UNSPECIFIED: Chronic | ICD-10-CM

## 2020-09-06 PROCEDURE — 93880 EXTRACRANIAL BILAT STUDY: CPT | Mod: 26

## 2020-09-06 PROCEDURE — 93880 EXTRACRANIAL BILAT STUDY: CPT

## 2021-04-18 ENCOUNTER — INPATIENT (INPATIENT)
Facility: HOSPITAL | Age: 71
LOS: 2 days | Discharge: HOME | DRG: 310 | End: 2021-04-21
Attending: INTERNAL MEDICINE | Admitting: INTERNAL MEDICINE
Payer: MEDICARE

## 2021-04-18 VITALS
TEMPERATURE: 98 F | HEART RATE: 116 BPM | SYSTOLIC BLOOD PRESSURE: 113 MMHG | HEIGHT: 68 IN | DIASTOLIC BLOOD PRESSURE: 75 MMHG | RESPIRATION RATE: 15 BRPM | OXYGEN SATURATION: 97 % | WEIGHT: 151.9 LBS

## 2021-04-18 DIAGNOSIS — D03.9 MELANOMA IN SITU, UNSPECIFIED: Chronic | ICD-10-CM

## 2021-04-18 DIAGNOSIS — E56.9 VITAMIN DEFICIENCY, UNSPECIFIED: ICD-10-CM

## 2021-04-18 DIAGNOSIS — I48.91 UNSPECIFIED ATRIAL FIBRILLATION: ICD-10-CM

## 2021-04-18 DIAGNOSIS — Z29.9 ENCOUNTER FOR PROPHYLACTIC MEASURES, UNSPECIFIED: ICD-10-CM

## 2021-04-18 DIAGNOSIS — E78.5 HYPERLIPIDEMIA, UNSPECIFIED: ICD-10-CM

## 2021-04-18 DIAGNOSIS — R09.89 OTHER SPECIFIED SYMPTOMS AND SIGNS INVOLVING THE CIRCULATORY AND RESPIRATORY SYSTEMS: ICD-10-CM

## 2021-04-18 LAB
ALBUMIN SERPL ELPH-MCNC: 4.5 G/DL — SIGNIFICANT CHANGE UP (ref 3.3–5)
ALP SERPL-CCNC: 102 U/L — SIGNIFICANT CHANGE UP (ref 40–120)
ALT FLD-CCNC: 29 U/L — SIGNIFICANT CHANGE UP (ref 10–45)
ANION GAP SERPL CALC-SCNC: 12 MMOL/L — SIGNIFICANT CHANGE UP (ref 5–17)
APPEARANCE UR: CLEAR — SIGNIFICANT CHANGE UP
APTT BLD: 28.9 SEC — SIGNIFICANT CHANGE UP (ref 27.5–35.5)
AST SERPL-CCNC: 24 U/L — SIGNIFICANT CHANGE UP (ref 10–40)
BACTERIA # UR AUTO: NEGATIVE — SIGNIFICANT CHANGE UP
BASOPHILS # BLD AUTO: 0.07 K/UL — SIGNIFICANT CHANGE UP (ref 0–0.2)
BASOPHILS NFR BLD AUTO: 1 % — SIGNIFICANT CHANGE UP (ref 0–2)
BILIRUB SERPL-MCNC: 0.3 MG/DL — SIGNIFICANT CHANGE UP (ref 0.2–1.2)
BILIRUB UR-MCNC: NEGATIVE — SIGNIFICANT CHANGE UP
BUN SERPL-MCNC: 23 MG/DL — SIGNIFICANT CHANGE UP (ref 7–23)
CALCIUM SERPL-MCNC: 9.3 MG/DL — SIGNIFICANT CHANGE UP (ref 8.4–10.5)
CHLORIDE SERPL-SCNC: 104 MMOL/L — SIGNIFICANT CHANGE UP (ref 96–108)
CO2 SERPL-SCNC: 27 MMOL/L — SIGNIFICANT CHANGE UP (ref 22–31)
COLOR SPEC: SIGNIFICANT CHANGE UP
CREAT SERPL-MCNC: 0.97 MG/DL — SIGNIFICANT CHANGE UP (ref 0.5–1.3)
DIFF PNL FLD: NEGATIVE — SIGNIFICANT CHANGE UP
EOSINOPHIL # BLD AUTO: 0.13 K/UL — SIGNIFICANT CHANGE UP (ref 0–0.5)
EOSINOPHIL NFR BLD AUTO: 1.9 % — SIGNIFICANT CHANGE UP (ref 0–6)
EPI CELLS # UR: 0 /HPF — SIGNIFICANT CHANGE UP
GLUCOSE SERPL-MCNC: 130 MG/DL — HIGH (ref 70–99)
GLUCOSE UR QL: NEGATIVE — SIGNIFICANT CHANGE UP
HCT VFR BLD CALC: 46.2 % — HIGH (ref 34.5–45)
HGB BLD-MCNC: 14.6 G/DL — SIGNIFICANT CHANGE UP (ref 11.5–15.5)
HYALINE CASTS # UR AUTO: 1 /LPF — SIGNIFICANT CHANGE UP (ref 0–2)
IMM GRANULOCYTES NFR BLD AUTO: 0.1 % — SIGNIFICANT CHANGE UP (ref 0–1.5)
INR BLD: 0.92 RATIO — SIGNIFICANT CHANGE UP (ref 0.88–1.16)
KETONES UR-MCNC: NEGATIVE — SIGNIFICANT CHANGE UP
LEUKOCYTE ESTERASE UR-ACNC: NEGATIVE — SIGNIFICANT CHANGE UP
LYMPHOCYTES # BLD AUTO: 2.06 K/UL — SIGNIFICANT CHANGE UP (ref 1–3.3)
LYMPHOCYTES # BLD AUTO: 29.5 % — SIGNIFICANT CHANGE UP (ref 13–44)
MCHC RBC-ENTMCNC: 31 PG — SIGNIFICANT CHANGE UP (ref 27–34)
MCHC RBC-ENTMCNC: 31.6 GM/DL — LOW (ref 32–36)
MCV RBC AUTO: 98.1 FL — SIGNIFICANT CHANGE UP (ref 80–100)
MONOCYTES # BLD AUTO: 0.57 K/UL — SIGNIFICANT CHANGE UP (ref 0–0.9)
MONOCYTES NFR BLD AUTO: 8.2 % — SIGNIFICANT CHANGE UP (ref 2–14)
NEUTROPHILS # BLD AUTO: 4.15 K/UL — SIGNIFICANT CHANGE UP (ref 1.8–7.4)
NEUTROPHILS NFR BLD AUTO: 59.3 % — SIGNIFICANT CHANGE UP (ref 43–77)
NITRITE UR-MCNC: NEGATIVE — SIGNIFICANT CHANGE UP
NRBC # BLD: 0 /100 WBCS — SIGNIFICANT CHANGE UP (ref 0–0)
PH UR: 5.5 — SIGNIFICANT CHANGE UP (ref 5–8)
PLATELET # BLD AUTO: 310 K/UL — SIGNIFICANT CHANGE UP (ref 150–400)
POTASSIUM SERPL-MCNC: 3.9 MMOL/L — SIGNIFICANT CHANGE UP (ref 3.5–5.3)
POTASSIUM SERPL-SCNC: 3.9 MMOL/L — SIGNIFICANT CHANGE UP (ref 3.5–5.3)
PROT SERPL-MCNC: 7 G/DL — SIGNIFICANT CHANGE UP (ref 6–8.3)
PROT UR-MCNC: NEGATIVE — SIGNIFICANT CHANGE UP
PROTHROM AB SERPL-ACNC: 11.1 SEC — SIGNIFICANT CHANGE UP (ref 10.6–13.6)
RBC # BLD: 4.71 M/UL — SIGNIFICANT CHANGE UP (ref 3.8–5.2)
RBC # FLD: 13.4 % — SIGNIFICANT CHANGE UP (ref 10.3–14.5)
RBC CASTS # UR COMP ASSIST: 1 /HPF — SIGNIFICANT CHANGE UP (ref 0–4)
SARS-COV-2 RNA SPEC QL NAA+PROBE: SIGNIFICANT CHANGE UP
SODIUM SERPL-SCNC: 143 MMOL/L — SIGNIFICANT CHANGE UP (ref 135–145)
SP GR SPEC: 1.02 — SIGNIFICANT CHANGE UP (ref 1.01–1.02)
TROPONIN T, HIGH SENSITIVITY RESULT: 14 NG/L — SIGNIFICANT CHANGE UP (ref 0–51)
UROBILINOGEN FLD QL: NEGATIVE — SIGNIFICANT CHANGE UP
WBC # BLD: 6.99 K/UL — SIGNIFICANT CHANGE UP (ref 3.8–10.5)
WBC # FLD AUTO: 6.99 K/UL — SIGNIFICANT CHANGE UP (ref 3.8–10.5)
WBC UR QL: 1 /HPF — SIGNIFICANT CHANGE UP (ref 0–5)

## 2021-04-18 PROCEDURE — 71046 X-RAY EXAM CHEST 2 VIEWS: CPT | Mod: 26

## 2021-04-18 PROCEDURE — 99223 1ST HOSP IP/OBS HIGH 75: CPT

## 2021-04-18 PROCEDURE — 93010 ELECTROCARDIOGRAM REPORT: CPT

## 2021-04-18 PROCEDURE — 99285 EMERGENCY DEPT VISIT HI MDM: CPT | Mod: CS

## 2021-04-18 RX ORDER — CARISOPRODOL 250 MG
0.5 TABLET ORAL
Qty: 0 | Refills: 0 | DISCHARGE

## 2021-04-18 RX ORDER — ASPIRIN/CALCIUM CARB/MAGNESIUM 324 MG
1 TABLET ORAL
Qty: 0 | Refills: 0 | DISCHARGE

## 2021-04-18 RX ORDER — MULTIVIT-MIN/FERROUS GLUCONATE 9 MG/15 ML
1 LIQUID (ML) ORAL
Qty: 0 | Refills: 0 | DISCHARGE

## 2021-04-18 RX ORDER — SODIUM CHLORIDE 9 MG/ML
500 INJECTION INTRAMUSCULAR; INTRAVENOUS; SUBCUTANEOUS
Refills: 0 | Status: DISCONTINUED | OUTPATIENT
Start: 2021-04-18 | End: 2021-04-21

## 2021-04-18 RX ORDER — DILTIAZEM HCL 120 MG
5 CAPSULE, EXT RELEASE 24 HR ORAL ONCE
Refills: 0 | Status: COMPLETED | OUTPATIENT
Start: 2021-04-18 | End: 2021-04-18

## 2021-04-18 RX ORDER — SIMVASTATIN 20 MG/1
20 TABLET, FILM COATED ORAL AT BEDTIME
Refills: 0 | Status: DISCONTINUED | OUTPATIENT
Start: 2021-04-18 | End: 2021-04-21

## 2021-04-18 RX ORDER — METOPROLOL TARTRATE 50 MG
5 TABLET ORAL ONCE
Refills: 0 | Status: COMPLETED | OUTPATIENT
Start: 2021-04-18 | End: 2021-04-18

## 2021-04-18 RX ORDER — METOPROLOL TARTRATE 50 MG
0.5 TABLET ORAL
Qty: 0 | Refills: 0 | DISCHARGE

## 2021-04-18 RX ORDER — CHOLECALCIFEROL (VITAMIN D3) 125 MCG
1000 CAPSULE ORAL DAILY
Refills: 0 | Status: DISCONTINUED | OUTPATIENT
Start: 2021-04-18 | End: 2021-04-21

## 2021-04-18 RX ORDER — METOPROLOL TARTRATE 50 MG
25 TABLET ORAL
Refills: 0 | Status: DISCONTINUED | OUTPATIENT
Start: 2021-04-19 | End: 2021-04-21

## 2021-04-18 RX ORDER — CHOLECALCIFEROL (VITAMIN D3) 125 MCG
1 CAPSULE ORAL
Qty: 0 | Refills: 0 | DISCHARGE

## 2021-04-18 RX ORDER — METOPROLOL TARTRATE 50 MG
25 TABLET ORAL EVERY 6 HOURS
Refills: 0 | Status: DISCONTINUED | OUTPATIENT
Start: 2021-04-18 | End: 2021-04-18

## 2021-04-18 RX ORDER — CYCLOSPORINE 0.5 MG/ML
1 EMULSION OPHTHALMIC
Qty: 0 | Refills: 0 | DISCHARGE

## 2021-04-18 RX ADMIN — SODIUM CHLORIDE 100 MILLILITER(S): 9 INJECTION INTRAMUSCULAR; INTRAVENOUS; SUBCUTANEOUS at 21:21

## 2021-04-18 RX ADMIN — SIMVASTATIN 20 MILLIGRAM(S): 20 TABLET, FILM COATED ORAL at 21:21

## 2021-04-18 RX ADMIN — Medication 5 MILLIGRAM(S): at 14:38

## 2021-04-18 RX ADMIN — Medication 5 MILLIGRAM(S): at 18:35

## 2021-04-18 RX ADMIN — Medication 25 MILLIGRAM(S): at 16:59

## 2021-04-18 NOTE — ED PROVIDER NOTE - CLINICAL SUMMARY MEDICAL DECISION MAKING FREE TEXT BOX
ATTG: : a fib x 2 days, not on ac, consider rate control, ac and cardio eval, admit to hospital for further care.

## 2021-04-18 NOTE — H&P ADULT - HISTORY OF PRESENT ILLNESS
70F w/ hx of afib no ton AC, HLD, GIB x2 in the past p/w episodes of palpitations. Pt endorses seeing her cardiologist in Sept 2020 for palpitations with slight increase in BB dose. Has noticed some increased SOB on walking up stairs intermittently. Starting yesterday AM she noticed episode of lightheadedness associate with SBP to 80s after taking her BB in the AM. She noticed more palpitations over the past 2 days and has increased her BB 1 tab per day. Came in to hospital to day due to degree of palpitations. Pt denies any fevers, chills, cough, chest pain or LE edema. Endorses semi-chronic headaches which she takes tylenol and ibuprofen for. Denies any recent melena but does endorse admission to [facility] in 2015 for GIB. Endoscopy and colonoscopy remarkable for gastritis, diverticular disease and internal hemorrhoids.     In ER: Lopressor 5 IV, Dilt 5 IV, metoprolol tartrate 25mg PO

## 2021-04-18 NOTE — H&P ADULT - PROBLEM SELECTOR PLAN 1
Current CHADSVASC 2-3. Current CHADSVASC 2-3. Hx of GIB in the past although pt was able to tolerate several months on Pradaxa.  -Cont. metoprolol tartrate 25mg BID for now as tolerated  -Telemetry  -F/u TSH  -TTE  -Cardiology consult in AM, discussed starting AC with patient. Pt will consider and wants to discuss with cardiologist best options.

## 2021-04-18 NOTE — ED PROVIDER NOTE - PMH
Diverticula, colon    H/O irritable bowel syndrome    H/O mitral valve prolapse  echo 2012  Melanoma in situ    Paroxysmal A-fib  2012 went to ER, treated with medication, discharged home  on metoprolol since 2012, no recurrence no NOAC

## 2021-04-18 NOTE — H&P ADULT - NSICDXPASTMEDICALHX_GEN_ALL_CORE_FT
PAST MEDICAL HISTORY:  Diverticula, colon     H/O irritable bowel syndrome     H/O mitral valve prolapse echo 2012    Melanoma in situ     Paroxysmal A-fib 2012 went to ER, treated with medication, discharged home  on metoprolol since 2012, no recurrence no NOAC

## 2021-04-18 NOTE — PATIENT PROFILE ADULT - FALL HARM RISK
Problem: Adult Inpatient Plan of Care  Goal: Optimal Comfort and Wellbeing  Outcome: Ongoing (interventions implemented as appropriate)  Plan of care reviewed with patient, medications explained. Pt currently resting comfortably in bed and appears to be sleeping in between care. VSS, bed in lowest, locked position with call light in reach. Purposeful rounding done.  No new needs identified at this time, will continue to monitor.       other

## 2021-04-18 NOTE — H&P ADULT - NSICDXPASTSURGICALHX_GEN_ALL_CORE_FT
PAST SURGICAL HISTORY:  Melanoma in situ skin graft of lower extremity  10/01/2018  FTSG from the left groin to reconstruct right leg wound s/p wide excision of melanoma in situ.

## 2021-04-18 NOTE — H&P ADULT - NSHPPHYSICALEXAM_GEN_ALL_CORE
Vital Signs Last 24 Hrs  T(C): 36.8 (04-18-21 @ 19:24), Max: 36.8 (04-18-21 @ 13:28)  T(F): 98.2 (04-18-21 @ 19:24), Max: 98.2 (04-18-21 @ 13:28)  HR: 107 (04-18-21 @ 19:24) (106 - 127)  BP: 110/91 (04-18-21 @ 19:24) (98/72 - 113/92)  BP(mean): 98 (04-18-21 @ 19:24) (98 - 98)  RR: 20 (04-18-21 @ 19:24) (15 - 20)  SpO2: 99% (04-18-21 @ 19:24) (97% - 100%)

## 2021-04-18 NOTE — H&P ADULT - PROBLEM SELECTOR PLAN 4
DVT PPx  -SCDs for now    I was asked to see this patient by the hospitalist in charge. [facility] to assume care for patient in AM and thereafter.

## 2021-04-18 NOTE — H&P ADULT - ASSESSMENT
70F w/ hx of afib no ton AC, HLD, GIB x2 in the past p/w episodes of palpitations and found to have afib w/ RVR

## 2021-04-18 NOTE — ED PROVIDER NOTE - ATTENDING CONTRIBUTION TO CARE
71 y/o f with pmhx a fib (paroxysmal and not on ac), HTN, HLD, presents for heart beating fast since yest. she took increased dose of Metoprolol (25mg) instead of usual )12.5mg) and still not better. no headache no chest pain no dizziness. no weakness or numbness. Had the same 9 yrs ago and just prior to procedure converted to sinus. no cough no fever no chills. no abd pain. took asa as well today   Gen.  no acute distress  HEENT:  perrl eomi  Lungs:  b/l bs  CVS: S1S2  tachycardic at 110-120's.  Abd;  soft non tender no distention  Ext: no edema or erythema  Neuro: aaox3 no focal deficits  MSK: strength 5/5 b/l upper and lower ext.

## 2021-04-18 NOTE — ED ADULT NURSE NOTE - OBJECTIVE STATEMENT
71 yo F pt with PMHx of AFib A&O3, SANDY presents to ED c/o intermittent palpitations, dizziness and SOB on exertion x 1 day. pt reports taking Metoprolol 25 mg yesterday morning, last night and this morning. pt is followed by Cardiologist Freddie. Pt is not currently experiencing palpitations dizziness or SOB, pt is on Tele monitor showing irregular AFib, strength upper and lower extremities 5/5, lung sounds are clear throughout, cap refill <2 seconds, NO JVD present.  pr denies: chest pain, abd pain, fever, chills, cough, congestion, NVD, diaphoresis, syncope, urinary symptoms.

## 2021-04-19 LAB
ALBUMIN SERPL ELPH-MCNC: 3.9 G/DL — SIGNIFICANT CHANGE UP (ref 3.3–5)
ALP SERPL-CCNC: 86 U/L — SIGNIFICANT CHANGE UP (ref 40–120)
ALT FLD-CCNC: 24 U/L — SIGNIFICANT CHANGE UP (ref 10–45)
ANION GAP SERPL CALC-SCNC: 10 MMOL/L — SIGNIFICANT CHANGE UP (ref 5–17)
AST SERPL-CCNC: 21 U/L — SIGNIFICANT CHANGE UP (ref 10–40)
BASOPHILS # BLD AUTO: 0.06 K/UL — SIGNIFICANT CHANGE UP (ref 0–0.2)
BASOPHILS NFR BLD AUTO: 0.9 % — SIGNIFICANT CHANGE UP (ref 0–2)
BILIRUB SERPL-MCNC: 0.4 MG/DL — SIGNIFICANT CHANGE UP (ref 0.2–1.2)
BUN SERPL-MCNC: 15 MG/DL — SIGNIFICANT CHANGE UP (ref 7–23)
CALCIUM SERPL-MCNC: 8.8 MG/DL — SIGNIFICANT CHANGE UP (ref 8.4–10.5)
CHLORIDE SERPL-SCNC: 107 MMOL/L — SIGNIFICANT CHANGE UP (ref 96–108)
CO2 SERPL-SCNC: 25 MMOL/L — SIGNIFICANT CHANGE UP (ref 22–31)
COVID-19 SPIKE DOMAIN AB INTERP: POSITIVE
COVID-19 SPIKE DOMAIN ANTIBODY RESULT: >250 U/ML — HIGH
CREAT SERPL-MCNC: 0.72 MG/DL — SIGNIFICANT CHANGE UP (ref 0.5–1.3)
EOSINOPHIL # BLD AUTO: 0.19 K/UL — SIGNIFICANT CHANGE UP (ref 0–0.5)
EOSINOPHIL NFR BLD AUTO: 2.9 % — SIGNIFICANT CHANGE UP (ref 0–6)
GLUCOSE SERPL-MCNC: 102 MG/DL — HIGH (ref 70–99)
HCT VFR BLD CALC: 46.3 % — HIGH (ref 34.5–45)
HCV AB S/CO SERPL IA: 0.09 S/CO — SIGNIFICANT CHANGE UP (ref 0–0.99)
HCV AB SERPL-IMP: SIGNIFICANT CHANGE UP
HGB BLD-MCNC: 14.6 G/DL — SIGNIFICANT CHANGE UP (ref 11.5–15.5)
IMM GRANULOCYTES NFR BLD AUTO: 0.3 % — SIGNIFICANT CHANGE UP (ref 0–1.5)
LYMPHOCYTES # BLD AUTO: 2.4 K/UL — SIGNIFICANT CHANGE UP (ref 1–3.3)
LYMPHOCYTES # BLD AUTO: 36.3 % — SIGNIFICANT CHANGE UP (ref 13–44)
MAGNESIUM SERPL-MCNC: 2.2 MG/DL — SIGNIFICANT CHANGE UP (ref 1.6–2.6)
MCHC RBC-ENTMCNC: 31.4 PG — SIGNIFICANT CHANGE UP (ref 27–34)
MCHC RBC-ENTMCNC: 31.5 GM/DL — LOW (ref 32–36)
MCV RBC AUTO: 99.6 FL — SIGNIFICANT CHANGE UP (ref 80–100)
MONOCYTES # BLD AUTO: 0.66 K/UL — SIGNIFICANT CHANGE UP (ref 0–0.9)
MONOCYTES NFR BLD AUTO: 10 % — SIGNIFICANT CHANGE UP (ref 2–14)
NEUTROPHILS # BLD AUTO: 3.28 K/UL — SIGNIFICANT CHANGE UP (ref 1.8–7.4)
NEUTROPHILS NFR BLD AUTO: 49.6 % — SIGNIFICANT CHANGE UP (ref 43–77)
NRBC # BLD: 0 /100 WBCS — SIGNIFICANT CHANGE UP (ref 0–0)
PLATELET # BLD AUTO: 268 K/UL — SIGNIFICANT CHANGE UP (ref 150–400)
POTASSIUM SERPL-MCNC: 4.2 MMOL/L — SIGNIFICANT CHANGE UP (ref 3.5–5.3)
POTASSIUM SERPL-SCNC: 4.2 MMOL/L — SIGNIFICANT CHANGE UP (ref 3.5–5.3)
PROT SERPL-MCNC: 6.3 G/DL — SIGNIFICANT CHANGE UP (ref 6–8.3)
RBC # BLD: 4.65 M/UL — SIGNIFICANT CHANGE UP (ref 3.8–5.2)
RBC # FLD: 13.7 % — SIGNIFICANT CHANGE UP (ref 10.3–14.5)
SARS-COV-2 IGG+IGM SERPL QL IA: >250 U/ML — HIGH
SARS-COV-2 IGG+IGM SERPL QL IA: POSITIVE
SODIUM SERPL-SCNC: 142 MMOL/L — SIGNIFICANT CHANGE UP (ref 135–145)
TSH SERPL-MCNC: 3.03 UIU/ML — SIGNIFICANT CHANGE UP (ref 0.27–4.2)
WBC # BLD: 6.61 K/UL — SIGNIFICANT CHANGE UP (ref 3.8–10.5)
WBC # FLD AUTO: 6.61 K/UL — SIGNIFICANT CHANGE UP (ref 3.8–10.5)

## 2021-04-19 PROCEDURE — 99223 1ST HOSP IP/OBS HIGH 75: CPT

## 2021-04-19 RX ORDER — DIGOXIN 250 MCG
500 TABLET ORAL ONCE
Refills: 0 | Status: DISCONTINUED | OUTPATIENT
Start: 2021-04-19 | End: 2021-04-19

## 2021-04-19 RX ORDER — DIGOXIN 250 MCG
250 TABLET ORAL ONCE
Refills: 0 | Status: DISCONTINUED | OUTPATIENT
Start: 2021-04-19 | End: 2021-04-19

## 2021-04-19 RX ORDER — APIXABAN 2.5 MG/1
5 TABLET, FILM COATED ORAL
Refills: 0 | Status: DISCONTINUED | OUTPATIENT
Start: 2021-04-19 | End: 2021-04-21

## 2021-04-19 RX ORDER — DIGOXIN 250 MCG
250 TABLET ORAL ONCE
Refills: 0 | Status: COMPLETED | OUTPATIENT
Start: 2021-04-19 | End: 2021-04-19

## 2021-04-19 RX ORDER — DIGOXIN 250 MCG
500 TABLET ORAL ONCE
Refills: 0 | Status: COMPLETED | OUTPATIENT
Start: 2021-04-19 | End: 2021-04-19

## 2021-04-19 RX ADMIN — SIMVASTATIN 20 MILLIGRAM(S): 20 TABLET, FILM COATED ORAL at 21:51

## 2021-04-19 RX ADMIN — APIXABAN 5 MILLIGRAM(S): 2.5 TABLET, FILM COATED ORAL at 18:41

## 2021-04-19 RX ADMIN — Medication 250 MICROGRAM(S): at 21:52

## 2021-04-19 RX ADMIN — Medication 30 MILLILITER(S): at 21:51

## 2021-04-19 RX ADMIN — Medication 1000 UNIT(S): at 12:53

## 2021-04-19 RX ADMIN — Medication 25 MILLIGRAM(S): at 05:22

## 2021-04-19 RX ADMIN — Medication 25 MILLIGRAM(S): at 18:41

## 2021-04-19 RX ADMIN — Medication 500 MICROGRAM(S): at 16:25

## 2021-04-19 NOTE — CONSULT NOTE ADULT - SUBJECTIVE AND OBJECTIVE BOX
MRN-[id number]    CHIEF COMPLAINT:  Patient is a 70y old  Female who presents with a chief complaint of Palpitations (19 Apr 2021 13:46)      HISTORY OF PRESENT ILLNESS:  LOKESH DUNAWAY is a 70y Female patient with past medical history of *** presenting with ***.     Allergies    No Known Allergies    Intolerances    	    PAST MEDICAL & SURGICAL HISTORY:  H/O mitral valve prolapse  echo 2012    H/O irritable bowel syndrome    Diverticula, colon    Paroxysmal A-fib  2012 went to ER, treated with medication, discharged home  on metoprolol since 2012, no recurrence no NOAC    Melanoma in situ    Melanoma in situ  skin graft of lower extremity  10/01/2018  FTSG from the left groin to reconstruct right leg wound s/p wide excision of melanoma in situ.        FAMILY HISTORY:  No pertinent family history in first degree relatives        SOCIAL HISTORY:    [ ] Non-smoker  [ ] Smoker  [ ] Alcohol    REVIEW OF SYSTEMS:  CONSTITUTIONAL: No fever, weight loss, or fatigue  EYES: No eye pain, visual disturbances, or discharge  ENMT:  No difficulty hearing, tinnitus, vertigo; No sinus or throat pain  NECK: No pain or stiffness  RESPIRATORY: No cough, wheezing, chills or hemoptysis; No Shortness of Breath  CARDIOVASCULAR: No chest pain, palpitations, passing out, dizziness, or leg swelling  GASTROINTESTINAL: No abdominal or epigastric pain. No nausea, vomiting, or hematemesis; No diarrhea or constipation. No melena or hematochezia.  GENITOURINARY: No dysuria, frequency, hematuria, or incontinence  NEUROLOGICAL: No headaches, memory loss, loss of strength, numbness, or tremors  SKIN: No itching, burning, rashes, or lesions   LYMPH Nodes: No enlarged glands  ENDOCRINE: No heat or cold intolerance; No hair loss  MUSCULOSKELETAL: No joint pain or swelling; No muscle, back, or extremity pain  PSYCHIATRIC: No depression, anxiety, mood swings, or difficulty sleeping  HEME/LYMPH: No easy bruising, or bleeding gums  ALLERY AND IMMUNOLOGIC: No hives or eczema	    [ ] All others negative	  [ ] Unable to obtain    I&O's Summary      PHYSICAL EXAM:  Vital Signs Last 24 Hrs  T(C): 36.9 (19 Apr 2021 11:48), Max: 36.9 (19 Apr 2021 11:48)  T(F): 98.5 (19 Apr 2021 11:48), Max: 98.5 (19 Apr 2021 11:48)  HR: 102 (19 Apr 2021 11:48) (102 - 127)  BP: 90/62 (19 Apr 2021 11:48) (90/62 - 132/94)  BP(mean): 98 (18 Apr 2021 19:24) (98 - 98)  RR: 18 (19 Apr 2021 11:48) (18 - 20)  SpO2: 99% (19 Apr 2021 11:48) (98% - 100%)  Appearance: Normal	  HEENT:   Normal oral mucosa, PERRL, EOMI	  Lymphatic: No lymphadenopathy  Cardiovascular: Normal S1 S2, No JVD, No murmurs, No edema  Respiratory: Lungs clear to auscultation	  Psychiatry: A & O x 3, Mood & affect appropriate  Gastrointestinal:  Soft, Non-tender, + BS	  Skin: No rashes, No ecchymoses, No cyanosis	  Neurologic: Non-focal  Extremities: Normal range of motion, No clubbing, cyanosis or edema  Vascular: Peripheral pulses palpable 2+ bilaterally    MEDICATIONS:  MEDICATIONS  (STANDING):  cholecalciferol 1000 Unit(s) Oral daily  metoprolol tartrate 25 milliGRAM(s) Oral two times a day  simvastatin 20 milliGRAM(s) Oral at bedtime  sodium chloride 0.9%. 500 milliLiter(s) (100 mL/Hr) IV Continuous <Continuous>    MEDICATIONS  (PRN):      Home Medications:  Aspirin Enteric Coated 81 mg oral delayed release tablet: 1 tab(s) orally once a day (18 Apr 2021 20:40)  carisoprodol 350 mg oral tablet: 0.5 tab(s) orally , As Needed (18 Apr 2021 20:40)  Centrum oral tablet: 1 tab(s) orally once a day (18 Apr 2021 20:40)  metoprolol succinate 25 mg oral tablet, extended release: 0.5 tab(s) orally once a day (18 Apr 2021 20:40)  Restasis 0.05% ophthalmic emulsion: 1 drop(s) to each affected eye once a day (18 Apr 2021 20:40)  simvastatin 20 mg oral tablet: 1 tab(s) orally once a day (at bedtime) (18 Apr 2021 20:40)  Vitamin D3 1000 intl units (25 mcg) oral capsule: 1  orally once a day (18 Apr 2021 20:40)      LABS:	 	  CBC Full  -  ( 19 Apr 2021 05:27 )  WBC Count : 6.61 K/uL  Hemoglobin : 14.6 g/dL  Hematocrit : 46.3 %  Platelet Count - Automated : 268 K/uL  Mean Cell Volume : 99.6 fl  Mean Cell Hemoglobin : 31.4 pg  Mean Cell Hemoglobin Concentration : 31.5 gm/dL  Auto Neutrophil # : 3.28 K/uL  Auto Lymphocyte # : 2.40 K/uL  Auto Monocyte # : 0.66 K/uL  Auto Eosinophil # : 0.19 K/uL  Auto Basophil # : 0.06 K/uL  Auto Neutrophil % : 49.6 %  Auto Lymphocyte % : 36.3 %  Auto Monocyte % : 10.0 %  Auto Eosinophil % : 2.9 %  Auto Basophil % : 0.9 %    04-19    142  |  107  |  15  ----------------------------<  102<H>  4.2   |  25  |  0.72  04-18    143  |  104  |  23  ----------------------------<  130<H>  3.9   |  27  |  0.97    Ca    8.8      19 Apr 2021 05:27  Ca    9.3      18 Apr 2021 14:31  Mg     2.2     04-19    TPro  6.3  /  Alb  3.9  /  TBili  0.4  /  DBili  x   /  AST  21  /  ALT  24  /  AlkPhos  86  04-19  TPro  7.0  /  Alb  4.5  /  TBili  0.3  /  DBili  x   /  AST  24  /  ALT  29  /  AlkPhos  102  04-18    PT/INR - ( 18 Apr 2021 14:31 )   PT: 11.1 sec;   INR: 0.92 ratio         PTT - ( 18 Apr 2021 14:31 )  PTT:28.9 sec        proBNP:   Lipid Profile:   HgA1c:   TSH:     TELEMETRY: 	    ECG:  	  RADIOLOGY:  OTHER: 	    CARDIAC TESTING/STUDIES:    [ ] Echocardiogram:  [ ]  Catheterization:  [ ] Stress Test:  	  	  ASSESSMENT/PLAN: 	    Olman Thurman MD, MPH, FELI, RPVI, FACC  Cardiovascular Specialist   [facility] ([facility])  [facility]  c: [phone] (text preferred and/or call)  e: [email or web address]  For all [facility] Cardiology and Cardiovascular Surgery on-service contact/call information, go to [email or web address] and use "[email or web address]" to login.  For outpatient Cardiology appointments, call  [phone] to arrange with a colleague; I do not have outpatient Cardiology clinic.   MRN-[id number]    CHIEF COMPLAINT:  Palpitations (2021 13:46)    HISTORY OF PRESENT ILLNESS:  LOKESH DUNAWAY is a 70y Female patient with past medical history of paroxysmal atrial fibrillation initial episode  and presumably remained in NSR until recently (not on A/C), HLD, presenting with occasional palpitations the past few weeks that would resolve spontaneously. Symptoms have overall been mild with increased SOB on walking up stairs and mild lightheadedness. Workup this far revealed recurrence of atrial fibrillation with RVR rates 140-150 given IV metoprolol and diltiazem with some improvement but consequently became relatively hypotensive limiting dosage. Initial HsT negative 14. ECG with Atrial fib RVR.  Denies chest pain and lower extremely edema Cardiology consulted for further management.     Allergies  No Known Allergies    PAST MEDICAL & SURGICAL HISTORY:  H/O mitral valve prolapse  echo   H/O irritable bowel syndrome  Diverticula, colon  Paroxysmal A-fib   went to ER, treated with medication, discharged home  on metoprolol since , no recurrence no NOAC  Melanoma in situ  Melanoma in situ  skin graft of lower extremity  10/01/2018  FTSG from the left groin to reconstruct right leg wound s/p wide excision of melanoma in situ.    FAMILY HISTORY:  No pertinent family history in first degree relatives    SOCIAL HISTORY:    Non-smoker    REVIEW OF SYSTEMS:  CONSTITUTIONAL: No fever, weight loss, or fatigue  EYES: No eye pain, visual disturbances, or discharge  ENMT:  No difficulty hearing, tinnitus, vertigo; No sinus or throat pain.   NECK: No pain or stiffness  RESPIRATORY: No cough, wheezing, chills or hemoptysis; POSITIVE Shortness of Breath with exertion.  CARDIOVASCULAR: No chest pain, passing out, or leg swelling. POSITIVE palpitations and lightheadedness.   GASTROINTESTINAL: No abdominal or epigastric pain. No nausea, vomiting, or hematemesis; No diarrhea or constipation. No melena or hematochezia.  GENITOURINARY: No dysuria, frequency, hematuria, or incontinence.  NEUROLOGICAL: No headaches, memory loss, loss of strength, numbness, or tremors  SKIN: No itching, burning, rashes, or lesions   LYMPH Nodes: No enlarged glands  ENDOCRINE: No heat or cold intolerance.  MUSCULOSKELETAL: No muscle, back, or extremity pain  PSYCHIATRIC: No depression, anxiety, mood swings, or difficulty sleeping  HEME/LYMPH: No easy bruising, or bleeding gums  ALLERY AND IMMUNOLOGIC: No hives or eczema	    PHYSICAL EXAM:  Vital Signs Last 24 Hrs  T(C): 36.9 (2021 11:48), Max: 36.9 (2021 11:48)  T(F): 98.5 (2021 11:48), Max: 98.5 (2021 11:48)  HR: 102 (2021 11:48) (102 - 127)  BP: 90/62 (2021 11:48) (90/62 - 132/94)  BP(mean): 98 (2021 19:24) (98 - 98)  RR: 18 (2021 11:48) (18 - 20)  SpO2: 99% (2021 11:48) (98% - 100%)    Appearance: Normal	  HEENT:   Normal oral mucosa.	  Lymphatic: No lymphadenopathy  Cardiovascular: Normal S1 S2, No JVD, No murmurs, No edema, tachycardic, irregularly irregular   Respiratory: Lungs clear to auscultation	  Psychiatry: A & O x 3, Mood & affect appropriate  Gastrointestinal:  Soft, Non-tender.  Skin: No rashes, No ecchymoses, No cyanosis  Neurologic: Non-focal  Extremities: Normal range of motion  Vascular: Peripheral pulses palpable 2+ bilaterally    MEDICATIONS:  MEDICATIONS  (STANDING):  cholecalciferol 1000 Unit(s) Oral daily  metoprolol tartrate 25 milliGRAM(s) Oral two times a day  simvastatin 20 milliGRAM(s) Oral at bedtime  sodium chloride 0.9%. 500 milliLiter(s) (100 mL/Hr) IV Continuous <Continuous>    Home Medications:  Aspirin Enteric Coated 81 mg oral delayed release tablet: 1 tab(s) orally once a day (2021 20:40)  carisoprodol 350 mg oral tablet: 0.5 tab(s) orally , As Needed (2021 20:40)  Centrum oral tablet: 1 tab(s) orally once a day (2021 20:40)  metoprolol succinate 25 mg oral tablet, extended release: 0.5 tab(s) orally once a day (2021 20:40)  Restasis 0.05% ophthalmic emulsion: 1 drop(s) to each affected eye once a day (2021 20:40)  simvastatin 20 mg oral tablet: 1 tab(s) orally once a day (at bedtime) (2021 20:40)  Vitamin D3 1000 intl units (25 mcg) oral capsule: 1  orally once a day (2021 20:40)    LABS:	 	  CBC Full  -  ( 2021 05:27 )  WBC Count : 6.61 K/uL  Hemoglobin : 14.6 g/dL  Hematocrit : 46.3 %  Platelet Count - Automated : 268 K/uL  Mean Cell Volume : 99.6 fl  Mean Cell Hemoglobin : 31.4 pg  Mean Cell Hemoglobin Concentration : 31.5 gm/dL  Auto Neutrophil # : 3.28 K/uL  Auto Lymphocyte # : 2.40 K/uL  Auto Monocyte # : 0.66 K/uL  Auto Eosinophil # : 0.19 K/uL  Auto Basophil # : 0.06 K/uL  Auto Neutrophil % : 49.6 %  Auto Lymphocyte % : 36.3 %  Auto Monocyte % : 10.0 %  Auto Eosinophil % : 2.9 %  Auto Basophil % : 0.9 %        142  |  107  |  15  ----------------------------<  102<H>  4.2   |  25  |  0.72      143  |  104  |  23  ----------------------------<  130<H>  3.9   |  27  |  0.97    Ca    8.8      2021 05:27  Ca    9.3      2021 14:31  Mg     2.2     -    TPro  6.3  /  Alb  3.9  /  TBili  0.4  /  DBili  x   /  AST  21  /  ALT  24  /  AlkPhos  86  -  TPro  7.0  /  Alb  4.5  /  TBili  0.3  /  DBili  x   /  AST  24  /  ALT  29  /  AlkPhos  102  04-18    PT/INR - ( 2021 14:31 )   PT: 11.1 sec;   INR: 0.92 ratio      PTT - ( 2021 14:31 )  PTT:28.9 sec    TELEMETRY: 2021  AF -150s.     EC2021  AF RVR, anteroseptal infarct, age indeterminate present on prior ECG .    CARDIAC TESTING/STUDIES:    Catheterization: 10/7/2019  PROCEDURE:  --  Left heart catheterization.  --  Left coronary angiography.  --  Right coronary angiography.  --  Sonosite - Diagnostic.    CORONARY VESSELS: The coronary circulation is right dominant.  LM:   --  LM: Normal.  LAD:   --  Proximal LAD: There was a 20 % stenosis.  --  Mid LAD: Angiography showed minor luminal irregularities with no flow  limiting lesions.  --  D1: Normal.  CX:   --  Circumflex: Normal.  --  OM1: Normal.  --  OM2: Normal.  RCA:   --  Proximal RCA: Angiography showed minor luminal irregularities  with no flow limiting lesions.    COMPLICATIONS: There were no complications.    DIAGNOSTIC IMPRESSIONS: Minor CAD.    DIAGNOSTIC RECOMMENDATIONS: Medical therapy    Stress Test: [employer] Pharm 2019  IMPRESSIONS: Abnormal Study  * Chest Pain: No chest pain with administration of  Regadenoson.  * Symptom: Shortness of breath.  * HR Response: Appropriate.  * BP Response: Appropriate.  * Heart Rhythm: Sinus Rhythm - 82 BPM.  * Baseline ECG: Nonspecific ST-T wave abnormality.  * ECG Changes: During the inital attempt at exercise  stress, there were approximately 0.5 mm horizontal ST  segment depressions in leads V3-V6 starting at 3:00 min of  exercise at 82 bpm and persisting at least 4:50 min in  recovery following pharmacologic stress.  .  * Arrhythmia: Single APD occurred during pharmacologic  stress. Rare VPDs occurred during rest and pharmacologic  stress.  There were runs of supraventricular tachycardia  (likely PAT) during the initial attempt at exercise stress  and during recovery.  * The left ventricle was normal in size. There are  meidum-sized, mild to moderate defects in the mid to  distal anterior, distal anterolateral, apical, and distal  inferior walls that are mostly reversible suggestive of  ischemia with partial scarring.  * Post-stress gated wall motion analysis was performed  (LVEF = 68 %;LVEDV = 84 ml.) revealing mildly reduced  systolic thickening of the mid to distal anterior, distal  anterolateral, and apical walls.  Overall left ventricular  ejection fraction is normal.    ADDENDUM 10/3/2019: To add radiopharmaceutical doses  	  ASSESSMENT/PLAN: 	  70y Female patient with past medical history of paroxysmal atrial fibrillation initial episode  and presumably remained in NSR until recently (not on A/C), HLD, presenting with recurrent atrial fibrillation.     1) paroxysmal Atrial Fibrillation   Not on A/C   CHADS2-Vasc 2 (female, age); indicated for A/C   s/p IV metoprolol and diltiazem with moderate control of heart rate briefly; relative hypotension.   Gaol HR <110.     ·	Continue medical management metoprolol tartrate 25 mg BID.   ·	Start digoxin with load and maintenance (one time  mcg, followed by 250 mcg six hours later x2 and maintenance 125 mcg daily)  ·	Start apixaban 5 mg BID   ·	Pending ECHO   ·	If she does not spontaneously convert, or continues to have poorly controlled heart rate, or hemodynamically symptomatic will tentatively plane for LUIS ENRIQUE/DCCV tomorrow.     2) HLD   Outpatient lipid panel     ·	Continue medical management with simvastatin 20 mg nightly.     Discussed with outpatient cardiology Dr. Hema Jaffe and in agreement.    Answered all questions with patient.    Olman Thurman MD, MPH, FELI, RPVI, FACC  Cardiovascular Specialist   [facility] ([facility])  [facility]  c: [phone] (text preferred and/or call)  e: [email or web address]  For all [facility] Cardiology and Cardiovascular Surgery on-service contact/call information, go to [email or web address] and use "[email or web address]" to login.  For outpatient Cardiology appointments, call  [phone] to arrange with a colleague; I do not have outpatient Cardiology clinic.

## 2021-04-20 PROCEDURE — 93306 TTE W/DOPPLER COMPLETE: CPT | Mod: 26

## 2021-04-20 PROCEDURE — 92960 CARDIOVERSION ELECTRIC EXT: CPT

## 2021-04-20 PROCEDURE — 93010 ELECTROCARDIOGRAM REPORT: CPT

## 2021-04-20 PROCEDURE — 93306 TTE W/DOPPLER COMPLETE: CPT | Mod: 26,76

## 2021-04-20 PROCEDURE — 93312 ECHO TRANSESOPHAGEAL: CPT | Mod: 26

## 2021-04-20 RX ORDER — DIGOXIN 250 MCG
125 TABLET ORAL DAILY
Refills: 0 | Status: DISCONTINUED | OUTPATIENT
Start: 2021-04-20 | End: 2021-04-20

## 2021-04-20 RX ADMIN — SIMVASTATIN 20 MILLIGRAM(S): 20 TABLET, FILM COATED ORAL at 21:11

## 2021-04-20 RX ADMIN — Medication 25 MILLIGRAM(S): at 06:04

## 2021-04-20 RX ADMIN — Medication 125 MICROGRAM(S): at 10:23

## 2021-04-20 RX ADMIN — Medication 1000 UNIT(S): at 10:23

## 2021-04-20 RX ADMIN — APIXABAN 5 MILLIGRAM(S): 2.5 TABLET, FILM COATED ORAL at 06:04

## 2021-04-20 RX ADMIN — APIXABAN 5 MILLIGRAM(S): 2.5 TABLET, FILM COATED ORAL at 17:48

## 2021-04-20 NOTE — PROCEDURE NOTE - ADDITIONAL PROCEDURE DETAILS
s/p successful LUIS ENRIQUE/DCCV. Patient in sinus rhythm with HR's 50's bpm.    --Consider D/C'in digoxin. Continue bblockers. Given young age, episodes of PAF, LUIS ENRIQUE showing mildly reduced LVEF in setting of RVR, recommend patient to be seen in EP clinic as outpatient for assessment of ablation in future. Discussed with Dr. Thurman

## 2021-04-20 NOTE — ASU PREOP CHECKLIST - HEIGHT IN INCHES
CC: f/u for cellulitis and phlebitis    Patient reports more pain in the thigh today    REVIEW OF SYSTEMS:  All other review of systems negative (Comprehensive ROS)    Antimicrobials Day #  :4  vancomycin  IVPB 1000 milliGRAM(s) IV Intermittent every 12 hours    Other Medications Reviewed    T(F): 97.7 (02-28-20 @ 12:30), Max: 97.7 (02-28-20 @ 12:30)  HR: 73 (02-28-20 @ 12:30)  BP: 126/78 (02-28-20 @ 12:30)  RR: 18 (02-28-20 @ 12:30)  SpO2: 94% (02-28-20 @ 12:30)  Wt(kg): --    PHYSICAL EXAM:  General: alert, no acute distress  Eyes:  anicteric, no conjunctival injection, no discharge  Oropharynx: no lesions or injection 	  Neck: supple, without adenopathy  Lungs: clear to auscultation  Heart: regular rate and rhythm; no murmur, rubs or gallops  Abdomen: soft, nondistended, nontender, without mass or organomegaly  Skin: no lesions  Extremities: no clubbing, cyanosis,. right medial thigh redness much improved, cord is now fully palpable and tender  Neurologic: alert, oriented, moves all extremities    LAB RESULTS:              MICROBIOLOGY:  RECENT CULTURES:  02-24 @ 23:02 .Blood Blood     No growth to date.          RADIOLOGY REVIEWED:    < from: CT Lower Extremity No Cont, Right (02.25.20 @ 22:25) >  EXAM:  CT LWR EXT RT                            PROCEDURE DATE:  02/25/2020            INTERPRETATION:  CT LOWER EXTREMITY RIGHT dated 2/25/2020 10:25 PM     INDICATION: 2 weeks of thigh pain and swelling    COMPARISON: Lower extremity Doppler 2/24/2020    TECHNIQUE: Axial CT images were obtained through the right femur. Coronal and sagittal reformatted images were made.     FINDINGS:  No acute fracture. Right hip joint space is maintained. Mild medial compartment joint space narrowing at the knee. No evidence of a joint effusion.  Status post hysterectomy with surgical clips.  There is stranding around the greater saphenous vein which appears expanded, consistent with the history of occlusive thrombus. This was better evaluated on the prior ultrasound.      IMPRESSION:    Stranding around the greater saphenous vein which appears expanded, consistent with the history of occlusive thrombus. This was better evaluated on the prior ultrasound.    < from: VA Duplex Lower Ext Vein Scan, Right (02.24.20 @ 21:31) >    EXAM:  DUPLEX EXT VEINS LOWER RT                            PROCEDURE DATE:  02/24/2020            INTERPRETATION:  EXAMINATION: US LOWER EXTREMITY VENOUS DUPLEX RIGHT      DATE OF EXAM: 2/24/2020 9:31 PM    HISTORY: Right lower extremity swelling and pain. Status post endovenous laser therapy.    COMPARISON: None.    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    Right femoral, and popliteal veins show normal compression with normal color and spectral Doppler waveform and normal response to augmentation. Visualized portions of the posterior tibialand peroneal veins show normal color and spectral flow.       IMPRESSION:    1.  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    < end of copied text >      < end of copied text >            Assessment:  Patient s/p laser ablation of varicose vein in right leg and developed dvt in the right leg with some cellulitis . I suspect phlebitis major issue but will continue the vancomycin for now  Plan:  continue vanco  eliquis  vascular re evaluation for increase in pain in the cord 8

## 2021-04-21 ENCOUNTER — TRANSCRIPTION ENCOUNTER (OUTPATIENT)
Age: 71
End: 2021-04-21

## 2021-04-21 VITALS
RESPIRATION RATE: 18 BRPM | HEART RATE: 65 BPM | SYSTOLIC BLOOD PRESSURE: 140 MMHG | DIASTOLIC BLOOD PRESSURE: 68 MMHG | OXYGEN SATURATION: 98 %

## 2021-04-21 LAB
ANION GAP SERPL CALC-SCNC: 10 MMOL/L — SIGNIFICANT CHANGE UP (ref 5–17)
BUN SERPL-MCNC: 16 MG/DL — SIGNIFICANT CHANGE UP (ref 7–23)
CALCIUM SERPL-MCNC: 9.1 MG/DL — SIGNIFICANT CHANGE UP (ref 8.4–10.5)
CHLORIDE SERPL-SCNC: 104 MMOL/L — SIGNIFICANT CHANGE UP (ref 96–108)
CO2 SERPL-SCNC: 27 MMOL/L — SIGNIFICANT CHANGE UP (ref 22–31)
CREAT SERPL-MCNC: 0.85 MG/DL — SIGNIFICANT CHANGE UP (ref 0.5–1.3)
GLUCOSE SERPL-MCNC: 174 MG/DL — HIGH (ref 70–99)
MAGNESIUM SERPL-MCNC: 2.1 MG/DL — SIGNIFICANT CHANGE UP (ref 1.6–2.6)
POTASSIUM SERPL-MCNC: 5.4 MMOL/L — HIGH (ref 3.5–5.3)
POTASSIUM SERPL-SCNC: 5.4 MMOL/L — HIGH (ref 3.5–5.3)
SODIUM SERPL-SCNC: 141 MMOL/L — SIGNIFICANT CHANGE UP (ref 135–145)

## 2021-04-21 PROCEDURE — 93312 ECHO TRANSESOPHAGEAL: CPT

## 2021-04-21 PROCEDURE — 80048 BASIC METABOLIC PNL TOTAL CA: CPT

## 2021-04-21 PROCEDURE — 84484 ASSAY OF TROPONIN QUANT: CPT

## 2021-04-21 PROCEDURE — 96374 THER/PROPH/DIAG INJ IV PUSH: CPT

## 2021-04-21 PROCEDURE — 85730 THROMBOPLASTIN TIME PARTIAL: CPT

## 2021-04-21 PROCEDURE — 85025 COMPLETE CBC W/AUTO DIFF WBC: CPT

## 2021-04-21 PROCEDURE — U0003: CPT

## 2021-04-21 PROCEDURE — 93005 ELECTROCARDIOGRAM TRACING: CPT

## 2021-04-21 PROCEDURE — 84443 ASSAY THYROID STIM HORMONE: CPT

## 2021-04-21 PROCEDURE — 80053 COMPREHEN METABOLIC PANEL: CPT

## 2021-04-21 PROCEDURE — 96375 TX/PRO/DX INJ NEW DRUG ADDON: CPT

## 2021-04-21 PROCEDURE — 71046 X-RAY EXAM CHEST 2 VIEWS: CPT

## 2021-04-21 PROCEDURE — 93010 ELECTROCARDIOGRAM REPORT: CPT

## 2021-04-21 PROCEDURE — 83735 ASSAY OF MAGNESIUM: CPT

## 2021-04-21 PROCEDURE — 99285 EMERGENCY DEPT VISIT HI MDM: CPT | Mod: 25

## 2021-04-21 PROCEDURE — 86769 SARS-COV-2 COVID-19 ANTIBODY: CPT

## 2021-04-21 PROCEDURE — 81001 URINALYSIS AUTO W/SCOPE: CPT

## 2021-04-21 PROCEDURE — 85610 PROTHROMBIN TIME: CPT

## 2021-04-21 PROCEDURE — 86803 HEPATITIS C AB TEST: CPT

## 2021-04-21 PROCEDURE — 92960 CARDIOVERSION ELECTRIC EXT: CPT

## 2021-04-21 PROCEDURE — 93306 TTE W/DOPPLER COMPLETE: CPT

## 2021-04-21 PROCEDURE — U0005: CPT

## 2021-04-21 RX ORDER — RIVAROXABAN 15 MG-20MG
1 KIT ORAL
Qty: 90 | Refills: 0
Start: 2021-04-21 | End: 2021-07-19

## 2021-04-21 RX ORDER — ACETAMINOPHEN 500 MG
650 TABLET ORAL ONCE
Refills: 0 | Status: COMPLETED | OUTPATIENT
Start: 2021-04-21 | End: 2021-04-21

## 2021-04-21 RX ORDER — APIXABAN 2.5 MG/1
1 TABLET, FILM COATED ORAL
Qty: 60 | Refills: 0
Start: 2021-04-21 | End: 2021-05-20

## 2021-04-21 RX ADMIN — APIXABAN 5 MILLIGRAM(S): 2.5 TABLET, FILM COATED ORAL at 06:05

## 2021-04-21 RX ADMIN — Medication 650 MILLIGRAM(S): at 12:20

## 2021-04-21 RX ADMIN — Medication 1000 UNIT(S): at 17:05

## 2021-04-21 RX ADMIN — Medication 650 MILLIGRAM(S): at 12:50

## 2021-04-21 RX ADMIN — APIXABAN 5 MILLIGRAM(S): 2.5 TABLET, FILM COATED ORAL at 17:05

## 2021-04-21 NOTE — DISCHARGE NOTE PROVIDER - NSDCCPCAREPLAN_GEN_ALL_CORE_FT
PRINCIPAL DISCHARGE DIAGNOSIS  Diagnosis: Atrial fibrillation with RVR  Assessment and Plan of Treatment: Continue with your cardiologist and primary care MD. Continue your current medications. Call your physician for palpitations, feelings of rapid heart beat, lightheadedness, or dizziness. Continue Xarelto as prescribed.      SECONDARY DISCHARGE DIAGNOSES  Diagnosis: HTN (hypertension)  Assessment and Plan of Treatment: Continue with your blood pressure medications; eat a heart healthy diet with low salt diet; exercise regularly (consult with your physician or cardiologist first); maintain a heart healthy weight; if you smoke - quit (A resource to help you stop smoking is the [facility] [employer] – phone number [phone].); include healthy ways to manage stress. Continue to follow with your primary care physician or cardiologist.    Diagnosis: HLD (hyperlipidemia)  Assessment and Plan of Treatment: Continue with your cholesterol medications. Eat a heart healthy diet that is low in saturated fats and salt, and includes whole grains, fruits, vegetables and lean protein; exercise regularly (consult with your physician or cardiologist first); maintain a heart healthy weight; if you smoke - quit (A resource to help you stop smoking is the [facility] [employer] Tobacco [employer] – phone number [phone].). Continue to follow with your primary physician or cardiologist.

## 2021-04-21 NOTE — DISCHARGE NOTE PROVIDER - CARE PROVIDER_API CALL
All Jaffe J  CARDIOVASCULAR DISEASE  [address] [place] [id number]  Phone: [phone]  Fax: [phone]  Follow Up Time: 2 weeks

## 2021-04-21 NOTE — DISCHARGE NOTE PROVIDER - NSDCCPTREATMENT_GEN_ALL_CORE_FT
PRINCIPAL PROCEDURE  Procedure: Transesophageal echocardiography (LUIS ENRIQUE) with bubble study and cardioversion  Findings and Treatment: completed 4/20

## 2021-04-21 NOTE — CHART NOTE - NSCHARTNOTEFT_GEN_A_CORE
Notified by RN of patient's telemetry notification of 's 5.4 sec at 6:48am, patient.  Telemetry called and found out pt had HR down to 30's with few episodes of 2-3 sec pauses.  Patient's SBP , reports feeling little dizzy when getting up and walking.  Feels a bit weak but otherwise no other complaints.     ICU Vital Signs Last 24 Hrs  T(C): 36.4 (21 Apr 2021 04:10), Max: 36.9 (20 Apr 2021 09:39)  T(F): 97.5 (21 Apr 2021 04:10), Max: 98.5 (20 Apr 2021 09:39)  HR: 55 (21 Apr 2021 07:01) (55 - 109)  BP: 116/43 (21 Apr 2021 07:01) (90/67 - 116/43)  BP(mean): 62 (20 Apr 2021 18:20) (54 - 62)  RR: 16 (21 Apr 2021 04:10) (15 - 17)  SpO2: 95% (21 Apr 2021 04:10) (95% - 98%)    70F w/ hx of afib not on AC, HLD, GIB x2 in the past p/w episodes of palpitations. Pt endorses seeing her cardiologist in Sept 2020 for palpitations with slight increase in BB dose. Has noticed some increased SOB on walking up stairs intermittently. Starting yesterday AM she noticed episode of lightheadedness associate with SBP to 80s after taking her BB in the AM. She noticed more palpitations over the past 2 days and has increased her BB 1 tab per day. Came in to hospital to day due to degree of palpitations. Pt denies any fevers, chills, cough, chest pain or LE edema. Endorses semi-chronic headaches which she takes tylenol and ibuprofen for. Denies any recent melena but does endorse admission to [facility] in 2015 for GIB. Endoscopy and colonoscopy remarkable for gastritis, diverticular disease and internal hemorrhoids.   Now s/p LUIS ENRIQUE/DCCV yesterday on 4/20.    Plan:  Missing am labs ordered  measure orthostatic BP   will notify Dr Thurman and Danish Ortiz, [facility]

## 2021-04-21 NOTE — PROGRESS NOTE ADULT - SUBJECTIVE AND OBJECTIVE BOX
SUBJ:    Home Medications:  Aspirin Enteric Coated 81 mg oral delayed release tablet: 1 tab(s) orally once a day (18 Apr 2021 20:40)  carisoprodol 350 mg oral tablet: 0.5 tab(s) orally , As Needed (18 Apr 2021 20:40)  Centrum oral tablet: 1 tab(s) orally once a day (18 Apr 2021 20:40)  metoprolol succinate 25 mg oral tablet, extended release: 0.5 tab(s) orally once a day (18 Apr 2021 20:40)  Restasis 0.05% ophthalmic emulsion: 1 drop(s) to each affected eye once a day (18 Apr 2021 20:40)  simvastatin 20 mg oral tablet: 1 tab(s) orally once a day (at bedtime) (18 Apr 2021 20:40)  Vitamin D3 1000 intl units (25 mcg) oral capsule: 1  orally once a day (18 Apr 2021 20:40)      MEDICATIONS  (STANDING):  apixaban 5 milliGRAM(s) Oral two times a day  cholecalciferol 1000 Unit(s) Oral daily  simvastatin 20 milliGRAM(s) Oral at bedtime  sodium chloride 0.9%. 500 milliLiter(s) (100 mL/Hr) IV Continuous <Continuous>    MEDICATIONS  (PRN):  aluminum hydroxide/magnesium hydroxide/simethicone Suspension 30 milliLiter(s) Oral every 4 hours PRN Dyspepsia      Vital Signs Last 24 Hrs  T(C): 36.7 (21 Apr 2021 09:14), Max: 36.9 (20 Apr 2021 09:39)  T(F): 98 (21 Apr 2021 09:14), Max: 98.5 (20 Apr 2021 09:39)  HR: 56 (21 Apr 2021 09:14) (55 - 109)  BP: 100/57 (21 Apr 2021 09:14) (90/67 - 116/43)  BP(mean): 62 (20 Apr 2021 18:20) (54 - 62)  RR: 16 (21 Apr 2021 09:14) (15 - 17)  SpO2: 95% (21 Apr 2021 09:14) (95% - 98%)    REVIEW OF SYSTEMS:  As per HPI, otherwise unremarkable.     PHYSICAL EXAM:  Constitutional/Appearance: Normal, Well-developed  HEENT:   Normal oral mucosa, no drainage or redness, supple neck  Lymphatic: No lymphadenopathy  Cardiovascular: Normal S1 S2, No edema, RRR  Respiratory: Lungs clear to auscultation, respirations non-labored  Psychiatry: A & O x 3, appropriate affect.   Gastrointestinal:  Soft, Non-tender, no distention  Skin: No rashes, No ecchymoses, No cyanosis	  Neurologic: Non-focal, Alert and oriented x 3  Extremities: Normal range of motion  Vascular: Peripheral pulses palpable 2+ bilaterally (radial)    TELEMETRY:    ECG:    TTE:    LABS:    04-21    141  |  104  |  16  ----------------------------<  174<H>  5.4<H>   |  27  |  0.85    Ca    9.1      21 Apr 2021 07:49  Mg     2.1     04-21    RADIOLOGY & ADDITIONAL STUDIES:    IMPRESSION AND PLAN:        Olman Thurman MD, MPH, FELI, RPVI, FACC  Cardiovascular Specialist   [facility] Freire [facility] ([facility])  [facility]  c: [phone] (text preferred and/or call)  e: [email or web address]  For all [facility] Cardiology and Cardiovascular Surgery on-service contact/call information, go to [email or web address] and use "[email or web address]" to login.  For outpatient Cardiology appointments, call [phone] to arrange with a colleague; I do not have outpatient Cardiology clinic.  
Lying in bed  She has had a few pauses on tele 2-3 seconds this morning and another after lunchtime  She was asymptomatic    Vital Signs Last 24 Hrs  T(C): 36.7 (21 Apr 2021 09:14), Max: 36.9 (20 Apr 2021 16:02)  T(F): 98 (21 Apr 2021 09:14), Max: 98.2 (20 Apr 2021 17:50)  HR: 63 (21 Apr 2021 12:50) (55 - 109)  BP: 112/62 (21 Apr 2021 12:50) (96/66 - 116/43)  BP(mean): 74 (21 Apr 2021 12:50) (54 - 74)  RR: 16 (21 Apr 2021 09:14) (15 - 16)  SpO2: 95% (21 Apr 2021 09:14) (95% - 98%)    I&O's Summary    04-20-21 @ 07:01  -  04-21-21 @ 07:00  --------------------------------------------------------  IN: 360 mL / OUT: 0 mL / NET: 360 mL    04-21-21 @ 07:01  -  04-21-21 @ 14:36  --------------------------------------------------------  IN: 480 mL / OUT: 0 mL / NET: 480 mL        GENERAL: NAD, well-developed  HEAD:  NCAT, EOMI  CHEST/LUNG: Clear to auscultation bilaterally; No wheezes  HEART: Regular rate and rhythm; nl S/S2; No murmurs, rubs, or gallops  ABDOMEN: Soft, Nontender, Nondistended; Bowel sounds present  EXTREMITIES:  2+ Peripheral Pulses, No LE edema b/l  NEURO: A+O x 3; speech/comprehension are intact  PSYCH: Nl affect; no agitation or delirium; no suicidal or homicidal ideation    LABS:    04-21    141  |  104  |  16  ----------------------------<  174<H>  5.4<H>   |  27  |  0.85    Ca    9.1      21 Apr 2021 07:49  Mg     2.1     04-21        CAPILLARY BLOOD GLUCOSE                RADIOLOGY & ADDITIONAL TESTS:    Imaging Personally Reviewed:  [x] YES  [ ] NO    Consultant(s) Notes Reviewed:  [x] YES  [ ] NO    
Upon ambulating, she reports feeling mild palpitations, but no chest pain  Also feels mild ALCANTAR when ambulating  @ rest is comfortable  On tele, has remained in afib overnight, VR ranging 60s-low 100s    Vital Signs Last 24 Hrs  T(C): 36.9 (2021 09:39), Max: 36.9 (2021 09:39)  T(F): 98.5 (2021 09:39), Max: 98.5 (2021 09:39)  HR: 88 (2021 09:39) (79 - 133)  BP: 90/67 (2021 09:39) (90/67 - 109/62)  BP(mean): 69 (2021 20:50) (69 - 69)  RR: 17 (2021 09:39) (17 - 18)  SpO2: 98% (2021 09:39) (96% - 98%)    I&O's Summary    21 @ 07:01  -  21 @ 13:32  --------------------------------------------------------  IN: 0 mL / OUT: 0 mL / NET: 0 mL        GENERAL: NAD, well-developed  HEAD:  NCAT, EOMI  CHEST/LUNG: Clear to auscultation bilaterally; No wheezes  HEART: Irregular rate and rhythm; No murmurs, rubs, or gallops  ABDOMEN: Soft, Nontender, Nondistended; Bowel sounds present  EXTREMITIES:  2+ Peripheral Pulses, No LE edema b/l  NEURO: A+O x 3; speech/comprehension are intact  PSYCH: Nl affect; no agitation or delirium; no suicidal or homicidal ideation      LABS:                        14.6   6.61  )-----------( 268      ( 2021 05:27 )             46.3     04-19    142  |  107  |  15  ----------------------------<  102<H>  4.2   |  25  |  0.72    Ca    8.8      2021 05:27  Mg     2.2     04-    TPro  6.3  /  Alb  3.9  /  TBili  0.4  /  DBili  x   /  AST  21  /  ALT  24  /  AlkPhos  86  -    PT/INR - ( 2021 14:31 )   PT: 11.1 sec;   INR: 0.92 ratio         PTT - ( 2021 14:31 )  PTT:28.9 sec  CAPILLARY BLOOD GLUCOSE            Urinalysis Basic - ( 2021 15:16 )    Color: Light Yellow / Appearance: Clear / S.025 / pH: x  Gluc: x / Ketone: Negative  / Bili: Negative / Urobili: Negative   Blood: x / Protein: Negative / Nitrite: Negative   Leuk Esterase: Negative / RBC: 1 /hpf / WBC 1 /HPF   Sq Epi: x / Non Sq Epi: 0 /hpf / Bacteria: Negative        RADIOLOGY & ADDITIONAL TESTS:    Imaging Personally Reviewed:  [x] YES  [ ] NO    Consultant(s) Notes Reviewed:  [x] YES  [ ] NO    
Patient is a 70y old  Female who presents with a chief complaint of Palpitations (2021 19:47)      SUBJECTIVE / OVERNIGHT EVENTS:    Lying in bed  No palpitations, dizziness, CP or SOB    Vital Signs Last 24 Hrs  T(C): 36.9 (2021 11:48), Max: 36.9 (2021 11:48)  T(F): 98.5 (2021 11:48), Max: 98.5 (2021 11:48)  HR: 102 (2021 11:48) (102 - 127)  BP: 90/62 (2021 11:48) (90/62 - 132/94)  BP(mean): 98 (2021 19:24) (98 - 98)  RR: 18 (2021 11:48) (18 - 20)  SpO2: 99% (2021 11:48) (98% - 100%)  I&O's Summary      GENERAL: NAD, well-developed  HEAD:  NCAT, EOMI  CHEST/LUNG: Clear to auscultation bilaterally; No wheezes  HEART: Irregular rate and rhythm; No murmurs, rubs, or gallops  ABDOMEN: Soft, Nontender, Nondistended; Bowel sounds present  EXTREMITIES:  2+ Peripheral Pulses, No LE edema b/l  NEURO: A+O x 3; speech/comprehension are intact  PSYCH: Nl affect; no agitation or delirium; no suicidal or homicidal ideation    LABS:                        14.6   6.61  )-----------( 268      ( 2021 05:27 )             46.3     04-19    142  |  107  |  15  ----------------------------<  102<H>  4.2   |  25  |  0.72    Ca    8.8      2021 05:27  Mg     2.2     04-19    TPro  6.3  /  Alb  3.9  /  TBili  0.4  /  DBili  x   /  AST  21  /  ALT  24  /  AlkPhos  86  04-19    PT/INR - ( 2021 14:31 )   PT: 11.1 sec;   INR: 0.92 ratio         PTT - ( 2021 14:31 )  PTT:28.9 sec  CAPILLARY BLOOD GLUCOSE            Urinalysis Basic - ( 2021 15:16 )    Color: Light Yellow / Appearance: Clear / S.025 / pH: x  Gluc: x / Ketone: Negative  / Bili: Negative / Urobili: Negative   Blood: x / Protein: Negative / Nitrite: Negative   Leuk Esterase: Negative / RBC: 1 /hpf / WBC 1 /HPF   Sq Epi: x / Non Sq Epi: 0 /hpf / Bacteria: Negative        RADIOLOGY & ADDITIONAL TESTS:    Imaging Personally Reviewed:  [x] YES  [ ] NO    Consultant(s) Notes Reviewed:  [x] YES  [ ] NO      MEDICATIONS  (STANDING):  cholecalciferol 1000 Unit(s) Oral daily  metoprolol tartrate 25 milliGRAM(s) Oral two times a day  simvastatin 20 milliGRAM(s) Oral at bedtime  sodium chloride 0.9%. 500 milliLiter(s) (100 mL/Hr) IV Continuous <Continuous>    MEDICATIONS  (PRN):      Care Discussed with Consultants/Other Providers [x] YES  [ ] NO    HEALTH ISSUES - PROBLEM Dx:  Suspected pulmonary embolism    Suspected deep vein thrombosis (DVT)    Atrial fibrillation with RVR  Atrial fibrillation with RVR    Hyperlipidemia, unspecified hyperlipidemia type  Hyperlipidemia, unspecified hyperlipidemia type    Vitamin deficiency  Vitamin deficiency    Prophylactic measure  Prophylactic measure

## 2021-04-21 NOTE — DISCHARGE NOTE PROVIDER - NSDCMRMEDTOKEN_GEN_ALL_CORE_FT
Aspirin Enteric Coated 81 mg oral delayed release tablet: 1 tab(s) orally once a day  carisoprodol 350 mg oral tablet: 0.5 tab(s) orally , As Needed  Centrum oral tablet: 1 tab(s) orally once a day  metoprolol succinate 25 mg oral tablet, extended release: 0.5 tab(s) orally once a day  Restasis 0.05% ophthalmic emulsion: 1 drop(s) to each affected eye once a day  simvastatin 20 mg oral tablet: 1 tab(s) orally once a day (at bedtime)  Vitamin D3 1000 intl units (25 mcg) oral capsule: 1  orally once a day   Aspirin Enteric Coated 81 mg oral delayed release tablet: 1 tab(s) orally once a day  carisoprodol 350 mg oral tablet: 0.5 tab(s) orally , As Needed  Centrum oral tablet: 1 tab(s) orally once a day  metoprolol succinate 25 mg oral tablet, extended release: 0.5 tab(s) orally once a day  Restasis 0.05% ophthalmic emulsion: 1 drop(s) to each affected eye once a day  rivaroxaban 20 mg oral tablet: 1 tab(s) orally once a day   simvastatin 20 mg oral tablet: 1 tab(s) orally once a day (at bedtime)  Vitamin D3 1000 intl units (25 mcg) oral capsule: 1  orally once a day

## 2021-04-21 NOTE — DISCHARGE NOTE NURSING/CASE MANAGEMENT/SOCIAL WORK - PATIENT PORTAL LINK FT
You can access the FollowMyHealth Patient Portal offered by [facility] by registering at the following website: [email or web address]. By joining [employer]s FollowMyHealth portal, you will also be able to view your health information using other applications (apps) compatible with our system.

## 2021-04-21 NOTE — PROGRESS NOTE ADULT - PROBLEM SELECTOR PLAN 1
Current CHADSVASC 2-3. Hx of GIB in the past although pt was able to tolerate several months on Pradaxa.  Cont. metoprolol tartrate 25mg BID for now as tolerated  Telemetry  TSH nl  TTE pending  Appreciate cardiology
Current CHADSVASC 2-3. Hx of GIB in the past although pt was able to tolerate several months on Pradaxa.  Cont. metoprolol tartrate 25mg BID w/parameters  Eliquis started 4/19/21  S/p digoxin load 4/19, now on 0.125 mg daily  Telemetry  TSH nl  TTE 4/20 revealed what appears to somewhat hypokinetic LV inferior wall, but requires definity for better visualization --> reordered  Appreciate cardiology
Current CHADSVASC 2-3. Hx of GIB in the past although pt was able to tolerate several months on Pradaxa  Off AVNB  Eliquis started 4/19/21  S/p digoxin load 4/19, but digoxin was stopped 4/20 2' sinus pauses  Telemetry  TSH nl  TTE 4/20 revealed what appears to somewhat hypokinetic LV inferior wall, but requires definity for better visualization   Appreciate cardiology

## 2021-04-21 NOTE — DISCHARGE NOTE PROVIDER - NSDCACTIVITY_GEN_ALL_CORE
Showering allowed/Stairs allowed/Walking - Indoors allowed/Walking - Outdoors allowed No restrictions

## 2021-04-21 NOTE — DISCHARGE NOTE PROVIDER - HOSPITAL COURSE
HPI:  70F w/ hx of afib not on AC, HLD, GIB x2 in the past p/w episodes of palpitations. Pt endorses seeing her cardiologist in Sept 2020 for palpitations with slight increase in BB dose. Has noticed some increased SOB on walking up stairs intermittently. Starting yesterday AM she noticed episode of lightheadedness associate with SBP to 80s after taking her BB in the AM. She noticed more palpitations over the past 2 days and has increased her BB 1 tab per day. Came in to hospital to day due to degree of palpitations. Pt denies any fevers, chills, cough, chest pain or LE edema. Endorses semi-chronic headaches which she takes tylenol and ibuprofen for. Denies any recent melena but does endorse admission to [facility] in 2015 for GIB. Endoscopy and colonoscopy remarkable for gastritis, diverticular disease and internal hemorrhoids.     In ER: Lopressor 5 IV, Dilt 5 IV, metoprolol tartrate 25mg PO (18 Apr 2021 19:47)    4/20 s/p LUIS ENRIQUE with cardioversion.    HPI:  70F w/ hx of afib not on AC, HLD, GIB x2 in the past p/w episodes of palpitations. Pt endorses seeing her cardiologist in Sept 2020 for palpitations with slight increase in BB dose. Has noticed some increased SOB on walking up stairs intermittently. Starting yesterday AM she noticed episode of lightheadedness associate with SBP to 80s after taking her BB in the AM. She noticed more palpitations over the past 2 days and has increased her BB 1 tab per day. Came in to hospital to day due to degree of palpitations. Pt denies any fevers, chills, cough, chest pain or LE edema. Endorses semi-chronic headaches which she takes tylenol and ibuprofen for. Denies any recent melena but does endorse admission to [place] in 2015 for GIB. Endoscopy and colonoscopy remarkable for gastritis, diverticular disease and internal hemorrhoids.     In ER: Lopressor 5 IV, Dilt 5 IV, metoprolol tartrate 25mg PO (18 Apr 2021 19:47)    4/20 s/p LUIS ENRIQUE with cardioversion.   Eliquis not covered by pt's insurance, switched to Xarelto 20daily.

## 2021-04-27 ENCOUNTER — NON-APPOINTMENT (OUTPATIENT)
Age: 71
End: 2021-04-27

## 2021-05-12 ENCOUNTER — APPOINTMENT (OUTPATIENT)
Dept: CARDIOLOGY | Facility: CLINIC | Age: 71
End: 2021-05-12
Payer: MEDICARE

## 2021-05-12 ENCOUNTER — NON-APPOINTMENT (OUTPATIENT)
Age: 71
End: 2021-05-12

## 2021-05-12 VITALS — DIASTOLIC BLOOD PRESSURE: 78 MMHG | HEART RATE: 58 BPM | SYSTOLIC BLOOD PRESSURE: 132 MMHG | OXYGEN SATURATION: 98 %

## 2021-05-12 PROCEDURE — 93000 ELECTROCARDIOGRAM COMPLETE: CPT

## 2021-05-12 PROCEDURE — 99215 OFFICE O/P EST HI 40 MIN: CPT

## 2021-05-12 NOTE — HISTORY OF PRESENT ILLNESS
[FreeTextEntry1] : 69 Year Old Woman - History of Osteoarhriitis, Palpitations, pAF [Not on AC because never recurred after 6 months] 4-2021 - Cardioverted- Presenting for cardiovascular evaluation. \par ===============\par ===============\par \par Palpitations never occurred with longstanding extra beats. Controlled on Metoprolol XL. \par However, over last year, increaseing over the last year.\par Also more fatigue\par ECG SR no ischemic no extra beats \par Holter - 2015 - On Metoprolol - No significant changes. \par Goes to gym 3x/wk. When she firsts starts out, gets fatigued. \par /80 sitting and standing manually by me. \par Cannot get HR above 91 because of metoprolol. \par +Exertional fatigue not present prior. \par Discussed getting a nuclear stress test given exertional symptoms.\par And then a Ziopatch to Follow. \par Nuclear Stress - 9-2019 - Mild ischemia; not significant\par Ziopatch - 9-2019 - Occasional SVT - Triggered Beats randomly but sometimes during SVTs; no AF\par ---------------------\par  \par Still fatigued. \par -------------\par \par S/p LUIS ENRIQUE DCCV - admitted to ED. \par ---------------\par -2021

## 2021-05-12 NOTE — REVIEW OF SYSTEMS
[Negative] : Heme/Lymph [Dyspnea on exertion] : not dyspnea during exertion [Palpitations] : no palpitations

## 2021-05-12 NOTE — DISCUSSION/SUMMARY
[FreeTextEntry1] : 69 Year Old Woman - History of Osteoarhriitis, Palpitations, pAF [Not on AC because never recurred after 6 months] - Presenting for cardiovascular evaluation. \par ===============\par LUIS ENRIQUE/DCCV - 4-2021 - [place]\par LHC -  - Normal Coronaries\par Nuclear Stress -  - Apical and inferior defects \par ===============\par Exertional Dyspnea\par - Ziopatch both on BB for 2 days and then off BB for 3 days to follow this\par - Can check TTE on a future appointment. \par \par HL\par - Continue Simvastatin\par \par \par \par \par \par \par \par \par \par \par \par \par \par \par \par \par \par \par \par \par \par \par \par Total Time Spent in face-to-face encounter was 40 minutes. >50% time spent in counseling and coordination of care and on addressing above medical conditions in assessment.\par All labs, imaging, consulting reports, and any relevant outside records including laboratory work personally reviewed in order to evaluate, manage, and coordinate care amongst providers.  Patient-Risk (High).\par \par

## 2021-05-12 NOTE — REASON FOR VISIT
[Follow-Up - Clinic] : a clinic follow-up of [FreeTextEntry2] :  Atral Fibrillation. Palpitations. HL

## 2021-05-14 LAB
ALBUMIN SERPL ELPH-MCNC: 4.6 G/DL
ALP BLD-CCNC: 104 U/L
ALT SERPL-CCNC: 18 U/L
ANION GAP SERPL CALC-SCNC: 10 MMOL/L
AST SERPL-CCNC: 21 U/L
BASOPHILS # BLD AUTO: 0.1 K/UL
BASOPHILS NFR BLD AUTO: 1.2 %
BILIRUB SERPL-MCNC: 0.3 MG/DL
BUN SERPL-MCNC: 17 MG/DL
CALCIUM SERPL-MCNC: 9.6 MG/DL
CHLORIDE SERPL-SCNC: 105 MMOL/L
CHOLEST SERPL-MCNC: 194 MG/DL
CO2 SERPL-SCNC: 27 MMOL/L
CREAT SERPL-MCNC: 0.79 MG/DL
EOSINOPHIL # BLD AUTO: 0.2 K/UL
EOSINOPHIL NFR BLD AUTO: 2.3 %
ESTIMATED AVERAGE GLUCOSE: 117 MG/DL
GLUCOSE SERPL-MCNC: 95 MG/DL
HBA1C MFR BLD HPLC: 5.7 %
HCT VFR BLD CALC: 43.2 %
HDLC SERPL-MCNC: 81 MG/DL
HGB BLD-MCNC: 13.3 G/DL
IMM GRANULOCYTES NFR BLD AUTO: 0.1 %
LDLC SERPL CALC-MCNC: 92 MG/DL
LYMPHOCYTES # BLD AUTO: 1.95 K/UL
LYMPHOCYTES NFR BLD AUTO: 22.6 %
MAN DIFF?: NORMAL
MCHC RBC-ENTMCNC: 30.4 PG
MCHC RBC-ENTMCNC: 30.8 GM/DL
MCV RBC AUTO: 98.6 FL
MONOCYTES # BLD AUTO: 0.97 K/UL
MONOCYTES NFR BLD AUTO: 11.2 %
NEUTROPHILS # BLD AUTO: 5.41 K/UL
NEUTROPHILS NFR BLD AUTO: 62.6 %
NONHDLC SERPL-MCNC: 113 MG/DL
NT-PROBNP SERPL-MCNC: 210 PG/ML
PLATELET # BLD AUTO: 304 K/UL
POTASSIUM SERPL-SCNC: 4.6 MMOL/L
PROT SERPL-MCNC: 6.7 G/DL
RBC # BLD: 4.38 M/UL
RBC # FLD: 12.9 %
SODIUM SERPL-SCNC: 142 MMOL/L
TRIGL SERPL-MCNC: 106 MG/DL
TSH SERPL-ACNC: 1.21 UIU/ML
WBC # FLD AUTO: 8.64 K/UL

## 2021-09-11 ENCOUNTER — FORM ENCOUNTER (OUTPATIENT)
Age: 71
End: 2021-09-11

## 2021-11-01 NOTE — H&P ADULT - NEGATIVE SKIN SYMPTOMS
Patient advised per  to go to the walk in clinic. Patient voices understanding of the same; and states that she will go right now.   no rash/no itching

## 2021-12-22 ENCOUNTER — APPOINTMENT (OUTPATIENT)
Dept: CARDIOLOGY | Facility: CLINIC | Age: 71
End: 2021-12-22
Payer: MEDICARE

## 2021-12-22 ENCOUNTER — NON-APPOINTMENT (OUTPATIENT)
Age: 71
End: 2021-12-22

## 2021-12-22 VITALS — DIASTOLIC BLOOD PRESSURE: 89 MMHG | OXYGEN SATURATION: 100 % | HEART RATE: 60 BPM | SYSTOLIC BLOOD PRESSURE: 167 MMHG

## 2021-12-22 PROCEDURE — 99215 OFFICE O/P EST HI 40 MIN: CPT

## 2021-12-22 PROCEDURE — 93000 ELECTROCARDIOGRAM COMPLETE: CPT

## 2022-02-10 LAB
24R-OH-CALCIDIOL SERPL-MCNC: 56.5 PG/ML
ALBUMIN SERPL ELPH-MCNC: 4.9 G/DL
ALP BLD-CCNC: 107 U/L
ALT SERPL-CCNC: 22 U/L
ANION GAP SERPL CALC-SCNC: 14 MMOL/L
AST SERPL-CCNC: 23 U/L
BASOPHILS # BLD AUTO: 0.07 K/UL
BASOPHILS NFR BLD AUTO: 1.1 %
BILIRUB SERPL-MCNC: 0.3 MG/DL
BUN SERPL-MCNC: 21 MG/DL
CALCIUM SERPL-MCNC: 9.6 MG/DL
CHLORIDE SERPL-SCNC: 103 MMOL/L
CHOLEST SERPL-MCNC: 245 MG/DL
CO2 SERPL-SCNC: 25 MMOL/L
CREAT SERPL-MCNC: 0.91 MG/DL
EOSINOPHIL # BLD AUTO: 0.13 K/UL
EOSINOPHIL NFR BLD AUTO: 2.1 %
ESTIMATED AVERAGE GLUCOSE: 117 MG/DL
GLUCOSE SERPL-MCNC: 103 MG/DL
HBA1C MFR BLD HPLC: 5.7 %
HCT VFR BLD CALC: 42.2 %
HDLC SERPL-MCNC: 112 MG/DL
HGB BLD-MCNC: 13.2 G/DL
IMM GRANULOCYTES NFR BLD AUTO: 0.2 %
LDLC SERPL CALC-MCNC: 107 MG/DL
LYMPHOCYTES # BLD AUTO: 2.14 K/UL
LYMPHOCYTES NFR BLD AUTO: 35.1 %
MAN DIFF?: NORMAL
MCHC RBC-ENTMCNC: 31 PG
MCHC RBC-ENTMCNC: 31.3 GM/DL
MCV RBC AUTO: 99.1 FL
MONOCYTES # BLD AUTO: 0.63 K/UL
MONOCYTES NFR BLD AUTO: 10.3 %
NEUTROPHILS # BLD AUTO: 3.11 K/UL
NEUTROPHILS NFR BLD AUTO: 51.2 %
NONHDLC SERPL-MCNC: 133 MG/DL
NT-PROBNP SERPL-MCNC: 176 PG/ML
PLATELET # BLD AUTO: 279 K/UL
POTASSIUM SERPL-SCNC: 4.8 MMOL/L
PROT SERPL-MCNC: 6.9 G/DL
RBC # BLD: 4.26 M/UL
RBC # FLD: 13.3 %
SODIUM SERPL-SCNC: 142 MMOL/L
TRIGL SERPL-MCNC: 128 MG/DL
TSH SERPL-ACNC: 1.24 UIU/ML
WBC # FLD AUTO: 6.09 K/UL

## 2022-04-04 ENCOUNTER — APPOINTMENT (OUTPATIENT)
Dept: PULMONOLOGY | Facility: CLINIC | Age: 72
End: 2022-04-04
Payer: MEDICARE

## 2022-04-04 VITALS
OXYGEN SATURATION: 97 % | TEMPERATURE: 97.7 F | WEIGHT: 155 LBS | RESPIRATION RATE: 16 BRPM | HEART RATE: 57 BPM | DIASTOLIC BLOOD PRESSURE: 60 MMHG | HEIGHT: 68 IN | BODY MASS INDEX: 23.49 KG/M2 | SYSTOLIC BLOOD PRESSURE: 130 MMHG

## 2022-04-04 DIAGNOSIS — J45.909 UNSPECIFIED ASTHMA, UNCOMPLICATED: ICD-10-CM

## 2022-04-04 DIAGNOSIS — R06.83 SNORING: ICD-10-CM

## 2022-04-04 DIAGNOSIS — Z80.0 FAMILY HISTORY OF MALIGNANT NEOPLASM OF DIGESTIVE ORGANS: ICD-10-CM

## 2022-04-04 DIAGNOSIS — Z82.49 FAMILY HISTORY OF ISCHEMIC HEART DISEASE AND OTHER DISEASES OF THE CIRCULATORY SYSTEM: ICD-10-CM

## 2022-04-04 DIAGNOSIS — Z87.891 PERSONAL HISTORY OF NICOTINE DEPENDENCE: ICD-10-CM

## 2022-04-04 DIAGNOSIS — Z63.4 DISAPPEARANCE AND DEATH OF FAMILY MEMBER: ICD-10-CM

## 2022-04-04 DIAGNOSIS — Z85.820 PERSONAL HISTORY OF MALIGNANT MELANOMA OF SKIN: ICD-10-CM

## 2022-04-04 DIAGNOSIS — J30.9 ALLERGIC RHINITIS, UNSPECIFIED: ICD-10-CM

## 2022-04-04 PROCEDURE — 94729 DIFFUSING CAPACITY: CPT

## 2022-04-04 PROCEDURE — 71046 X-RAY EXAM CHEST 2 VIEWS: CPT

## 2022-04-04 PROCEDURE — 99204 OFFICE O/P NEW MOD 45 MIN: CPT | Mod: 25

## 2022-04-04 PROCEDURE — 95012 NITRIC OXIDE EXP GAS DETER: CPT

## 2022-04-04 PROCEDURE — 94010 BREATHING CAPACITY TEST: CPT

## 2022-04-04 PROCEDURE — 94727 GAS DIL/WSHOT DETER LNG VOL: CPT

## 2022-04-04 PROCEDURE — 94618 PULMONARY STRESS TESTING: CPT

## 2022-04-04 RX ORDER — BUDESONIDE AND FORMOTEROL FUMARATE DIHYDRATE 160; 4.5 UG/1; UG/1
160-4.5 AEROSOL RESPIRATORY (INHALATION) TWICE DAILY
Qty: 1 | Refills: 3 | Status: ACTIVE | COMMUNITY
Start: 2022-04-04 | End: 1900-01-01

## 2022-04-04 RX ORDER — DOXYCYCLINE HYCLATE 100 MG/1
100 CAPSULE ORAL
Qty: 7 | Refills: 0 | Status: DISCONTINUED | COMMUNITY
Start: 2018-11-16 | End: 2022-04-04

## 2022-04-04 SDOH — SOCIAL STABILITY - SOCIAL INSECURITY: DISSAPEARANCE AND DEATH OF FAMILY MEMBER: Z63.4

## 2022-04-04 NOTE — ADDENDUM
[FreeTextEntry1] : Documented by Brittany Robert acting as a scribe for Dr. Alexis Palomares on 04/04/2022 \par \par All medical record entries made by the Scribe were at my, Dr. Alexis Palomares's, direction and personally dictated by me on 04/04/2022 . I have reviewed the chart and agree that the record accurately reflects my personal performance of the history, physical exam, assessment and plan. I have also personally directed, reviewed, and agree with the discharge instructions

## 2022-04-04 NOTE — PROCEDURE
[FreeTextEntry1] : Full PFT revealed normal flows, with a FEV1 of 2.89L,which is 111% of predicted,  normal lung volumes, and normal diffusion of 17.5 , which is 85% of predicted with a normal flow volume loop \par \par FENO was 9; a normal value being less than 25\par Fractional exhaled nitric oxide (FENO) is regarded as a simple, noninvasive method for assessing eosinophilic airway inflammation. Produced by a variety of cells within the lung, nitric oxide (NO) concentrations are generally low in healthy individuals. However, high concentrations of NO appear to be involved in nonspecific host defense mechanisms and chronic inflammatory diseases such as asthma. The American Thoracic Society (ATS) therefore has recommended using FENO to aid in the diagnosis and monitoring of eosinophilic airway inflammation and asthma, and for identifying steroid responsive individuals whose chronic respiratory symptoms may be caused by airway inflammation. \par \par CXR reveals a normal sized heart; hyperinflation bilaterally- scoliosis in left- calcified cartilage \par \par Sleep study (9.12.2021) revealed sleep apnea with an AHI/MIROSLAVA of11.5 and a low oxygen saturation of 88%\par \par 6 minute walk test reveals a low saturation of 96% with very slight dyspnea or fatigue; walked 554.2 meters

## 2022-04-04 NOTE — ASSESSMENT
[FreeTextEntry1] : Ms. DUNAWAY is a 71 year old female with a history of melanoma, former less than 20 years of smoking, PAF, HLD, AFib, sinus bradycardia, arthritis in left knee presenting to the office today for initial pulmonary evaluation for SOB, ?mild intermittent asthma, allergies, ?SOUMYA\par \par Her shortness of breath is multifactorial due to:\par -poor mechanics of breathing \par -out of shape / overweight\par -pulmonary disease\par    -mild intermittent asthma \par -?cardiac disease \par    -AFib \par \par problem 1:mild intermittent asthma \par -add Symbicort 160 2 inhalations BID \par -Asthma is  believed to be caused by inherited (genetic) and environmental factor, but its exact cause is unknown. Asthma may be triggered by allergens, lung infections, or irritants in the air. Asthma triggers are different for each person \par -Inhaler technique reviewed as well as oral hygiene techniques reviewed with patient. Avoidance of cold air, extremes of temperature, rescue inhaler should be used before exercise. Order of medication reviewed with patient. Recommended use of a cool mist humidifier in the bedroom. \par \par problem 2:allergies/ sinus \par -recommended Xlear saline \par -get Blood work to include: asthma panel, food IgE panel, IgE level, eosinophil level, vitamin D level \par -Environmental measures for allergies were encouraged including mattress and pillow covers, air purifier, and environmental controls. \par \par problem 3:SOUMYA (elevated mallampati class, Afib, nocturia, snoring ) \par -recommended excite \par -s/p home sleep study- recommended Dental Device (Selzter) \par Sleep apnea is associated with adverse clinical consequences which can affect most organ systems.  Cardiovascular disease risk includes arrhythmias, atrial fibrillation, hypertension, coronary artery disease, and stroke. Metabolic disorders include diabetes type 2, non-alcoholic fatty liver disease. Mood disorder especially depression; and cognitive decline especially in the elderly. Associations with  chronic reflux/Gonzalez’s esophagus some but not all inclusive. \par -Reasons  include arousal consistent with hypopnea; respiratory events most prominent in REM sleep or supine position; therefore sleep staging and body position are important for accurate diagnosis and estimation of AHI. \par \par problem 4: cardiac disease- Afib \par -recommended to continue to follow up with Cardiologist \par \par problem 5: poor breathing mechanics\par -recommended Wim Hof and Buteyko breathing techniques\par -recommended internet exercise platforms: [employer] and Aerobic exercise for seniors\par -Proper breathing techniques were reviewed with an emphasis of exhalation. Patient instructed to breath in for 1 second and out for four seconds. Patient was encouraged to not talk while walking. \par \par problem 6: out of shape / overweight\par -recommended "MUNIQ" supplements \par -Weight loss, exercise, and diet control were discussed and are highly encouraged. Treatment options were given such as, aqua therapy, and contacting a nutritionist. Recommended to use the elliptical, stationary bike, less use of treadmill. Mindful eating was explained to the patient Obesity is associated with worsening asthma, shortness of breath, and potential for cardiac disease, diabetes, and other underlying medical conditions\par \par problem 7: health maintenance \par -recommended yearly flu shot after October 15\par -recommended strep pneumonia vaccines: Prevnar-13 vaccine, followed by Pneumo vaccine 23 one year following after 65\par -recommended early intervention for Upper Respiratory Infections (URIs)\par -recommended regular osteoporosis evaluations\par -recommended early dermatological evaluations\par -recommended after the age of 50 to the age of 70, colonoscopy every 5 years\par \par F/U in 6-8 weeks.\par She is encouraged to call with any changes, concerns, or questions\par

## 2022-04-04 NOTE — HISTORY OF PRESENT ILLNESS
[TextBox_4] : Ms. DUNAWAY is a 71 year old female with a history of melanoma, former less than 20 years of smoking, PAF, HLD, AFib, sinus bradycardia, arthritis in left knee presenting to the office today for initial pulmonary evaluation. Her chief complaint is\par \par -she notes lower back issues \par -she notes going to the gym and walking for exercise \par -she notes in hospital due to AFib\par -she notes multiple episodes of SOB when sleeping \par -she notes SOB on stairs and incline\par -she notes ALCANTAR\par -she notes multiple episodes of bronchitis when smoking \par -she notes weight is stable \par -she notes allergies active in [facility] \par -she notes allergy symptoms is rhinitis, PND, sneezing, and epistaxis \par -she denies wheezing \par -she notes sleeping about 6 hrs on average \par -she notes sleep is interrupted by nocturia x 2\par -she notes constantly moving when sleeping \par -She notes could fall asleep while watching a boring TV show  \par -She notes She could fall asleep in a car\par -she notes memory and concentration has decreased from baseline \par -she notes arthralgia in the knee \par -she notes appetite is stable \par -s/p Covid 19 vaccine x3 \par -she denies dysphonia \par -she notes snoring \par -she notes simvastatin. Xarelto and metoprolol \par \par -she denies any headaches, nausea, vomiting, fever, chills, sweats, chest pain, chest pressure, diarrhea, constipation, dysphagia, dizziness, leg swelling, leg pain, itchy eyes, itchy ears, heartburn, reflux, sour taste in the mouth, myalgias or arthralgias.

## 2022-04-08 ENCOUNTER — LABORATORY RESULT (OUTPATIENT)
Age: 72
End: 2022-04-08

## 2022-04-09 LAB
24R-OH-CALCIDIOL SERPL-MCNC: 57.6 PG/ML
25(OH)D3 SERPL-MCNC: 43.1 NG/ML
BASOPHILS # BLD AUTO: 0.07 K/UL
BASOPHILS NFR BLD AUTO: 1 %
EOSINOPHIL # BLD AUTO: 0.18 K/UL
EOSINOPHIL NFR BLD AUTO: 2.6 %
HCT VFR BLD CALC: 40.5 %
HGB BLD-MCNC: 13 G/DL
IMM GRANULOCYTES NFR BLD AUTO: 0.4 %
LYMPHOCYTES # BLD AUTO: 1.82 K/UL
LYMPHOCYTES NFR BLD AUTO: 26.2 %
MAN DIFF?: NORMAL
MCHC RBC-ENTMCNC: 31.6 PG
MCHC RBC-ENTMCNC: 32.1 GM/DL
MCV RBC AUTO: 98.5 FL
MONOCYTES # BLD AUTO: 0.73 K/UL
MONOCYTES NFR BLD AUTO: 10.5 %
NEUTROPHILS # BLD AUTO: 4.12 K/UL
NEUTROPHILS NFR BLD AUTO: 59.3 %
PLATELET # BLD AUTO: 264 K/UL
RBC # BLD: 4.11 M/UL
RBC # FLD: 13.5 %
WBC # FLD AUTO: 6.95 K/UL

## 2022-04-11 ENCOUNTER — NON-APPOINTMENT (OUTPATIENT)
Age: 72
End: 2022-04-11

## 2022-04-11 ENCOUNTER — TRANSCRIPTION ENCOUNTER (OUTPATIENT)
Age: 72
End: 2022-04-11

## 2022-04-11 ENCOUNTER — RX RENEWAL (OUTPATIENT)
Age: 72
End: 2022-04-11

## 2022-04-14 LAB
DEPRECATED DUCK FEATHER IGE RAST QL: 0
DEPRECATED GOOSE FEATHER IGE RAST QL: 0
DUCK FEATHER IGE QN: <0.1 KUA/L
GOOSE FEATHER IGE QN: <0.1 KUA/L
TOTAL IGE SMQN RAST: 8 KU/L

## 2022-04-16 LAB
A ALTERNATA IGE QN: <0.1 KUA/L
A FUMIGATUS IGE QN: <0.1 KUA/L
C ALBICANS IGE QN: <0.1 KUA/L
C HERBARUM IGE QN: <0.1 KUA/L
CAT DANDER IGE QN: <0.1 KUA/L
CLAM IGE QN: <0.1 KUA/L
CODFISH IGE QN: <0.1 KUA/L
COMMON RAGWEED IGE QN: <0.1 KUA/L
CORN IGE QN: <0.1 KUA/L
COW MILK IGE QN: <0.1 KUA/L
D FARINAE IGE QN: <0.1 KUA/L
D PTERONYSS IGE QN: <0.1 KUA/L
DEPRECATED A ALTERNATA IGE RAST QL: 0
DEPRECATED A FUMIGATUS IGE RAST QL: 0
DEPRECATED C ALBICANS IGE RAST QL: 0
DEPRECATED C HERBARUM IGE RAST QL: 0
DEPRECATED CAT DANDER IGE RAST QL: 0
DEPRECATED CLAM IGE RAST QL: 0
DEPRECATED CODFISH IGE RAST QL: 0
DEPRECATED COMMON RAGWEED IGE RAST QL: 0
DEPRECATED CORN IGE RAST QL: 0
DEPRECATED COW MILK IGE RAST QL: 0
DEPRECATED D FARINAE IGE RAST QL: 0
DEPRECATED D PTERONYSS IGE RAST QL: 0
DEPRECATED DOG DANDER IGE RAST QL: 0
DEPRECATED EGG WHITE IGE RAST QL: 0
DEPRECATED M RACEMOSUS IGE RAST QL: 0
DEPRECATED PEANUT IGE RAST QL: 0
DEPRECATED ROACH IGE RAST QL: 0
DEPRECATED SCALLOP IGE RAST QL: <0.1 KUA/L
DEPRECATED SESAME SEED IGE RAST QL: 0
DEPRECATED SHRIMP IGE RAST QL: 0
DEPRECATED SOYBEAN IGE RAST QL: 0
DEPRECATED TIMOTHY IGE RAST QL: 0
DEPRECATED WALNUT IGE RAST QL: 0
DEPRECATED WHEAT IGE RAST QL: 0
DEPRECATED WHITE OAK IGE RAST QL: 0
DOG DANDER IGE QN: <0.1 KUA/L
EGG WHITE IGE QN: <0.1 KUA/L
M RACEMOSUS IGE QN: <0.1 KUA/L
PEANUT IGE QN: <0.1 KUA/L
ROACH IGE QN: <0.1 KUA/L
SCALLOP IGE QN: 0
SCALLOP IGE QN: <0.1 KUA/L
SESAME SEED IGE QN: <0.1 KUA/L
SOYBEAN IGE QN: <0.1 KUA/L
TIMOTHY IGE QN: <0.1 KUA/L
WALNUT IGE QN: <0.1 KUA/L
WHEAT IGE QN: <0.1 KUA/L
WHITE OAK IGE QN: <0.1 KUA/L

## 2022-04-19 LAB

## 2022-04-21 ENCOUNTER — NON-APPOINTMENT (OUTPATIENT)
Age: 72
End: 2022-04-21

## 2022-04-21 ENCOUNTER — TRANSCRIPTION ENCOUNTER (OUTPATIENT)
Age: 72
End: 2022-04-21

## 2022-06-10 ENCOUNTER — APPOINTMENT (OUTPATIENT)
Dept: PULMONOLOGY | Facility: CLINIC | Age: 72
End: 2022-06-10

## 2022-11-09 ENCOUNTER — NON-APPOINTMENT (OUTPATIENT)
Age: 72
End: 2022-11-09

## 2022-11-23 ENCOUNTER — NON-APPOINTMENT (OUTPATIENT)
Age: 72
End: 2022-11-23

## 2022-11-23 ENCOUNTER — EMERGENCY (EMERGENCY)
Facility: HOSPITAL | Age: 72
LOS: 1 days | Discharge: HOME | End: 2022-11-23
Attending: EMERGENCY MEDICINE
Payer: MEDICARE

## 2022-11-23 ENCOUNTER — APPOINTMENT (OUTPATIENT)
Dept: CARDIOLOGY | Facility: CLINIC | Age: 72
End: 2022-11-23

## 2022-11-23 VITALS — HEART RATE: 59 BPM | DIASTOLIC BLOOD PRESSURE: 77 MMHG | SYSTOLIC BLOOD PRESSURE: 123 MMHG

## 2022-11-23 VITALS
HEART RATE: 108 BPM | WEIGHT: 159 LBS | DIASTOLIC BLOOD PRESSURE: 70 MMHG | BODY MASS INDEX: 24.1 KG/M2 | OXYGEN SATURATION: 99 % | SYSTOLIC BLOOD PRESSURE: 127 MMHG | HEIGHT: 68 IN

## 2022-11-23 VITALS
HEIGHT: 68 IN | SYSTOLIC BLOOD PRESSURE: 151 MMHG | TEMPERATURE: 98 F | WEIGHT: 149.91 LBS | DIASTOLIC BLOOD PRESSURE: 108 MMHG | HEART RATE: 120 BPM | OXYGEN SATURATION: 98 % | RESPIRATION RATE: 20 BRPM

## 2022-11-23 DIAGNOSIS — D03.9 MELANOMA IN SITU, UNSPECIFIED: Chronic | ICD-10-CM

## 2022-11-23 DIAGNOSIS — R00.2 PALPITATIONS: ICD-10-CM

## 2022-11-23 LAB
ALBUMIN SERPL ELPH-MCNC: 4.7 G/DL — SIGNIFICANT CHANGE UP (ref 3.3–5)
ALP SERPL-CCNC: 118 U/L — SIGNIFICANT CHANGE UP (ref 40–120)
ALT FLD-CCNC: 27 U/L — SIGNIFICANT CHANGE UP (ref 10–45)
ANION GAP SERPL CALC-SCNC: 12 MMOL/L — SIGNIFICANT CHANGE UP (ref 5–17)
APTT BLD: 34.8 SEC — SIGNIFICANT CHANGE UP (ref 27.5–35.5)
AST SERPL-CCNC: 30 U/L — SIGNIFICANT CHANGE UP (ref 10–40)
BASOPHILS # BLD AUTO: 0.12 K/UL — SIGNIFICANT CHANGE UP (ref 0–0.2)
BASOPHILS NFR BLD AUTO: 1.2 % — SIGNIFICANT CHANGE UP (ref 0–2)
BILIRUB SERPL-MCNC: 0.4 MG/DL — SIGNIFICANT CHANGE UP (ref 0.2–1.2)
BUN SERPL-MCNC: 16 MG/DL — SIGNIFICANT CHANGE UP (ref 7–23)
CALCIUM SERPL-MCNC: 9.8 MG/DL — SIGNIFICANT CHANGE UP (ref 8.4–10.5)
CHLORIDE SERPL-SCNC: 102 MMOL/L — SIGNIFICANT CHANGE UP (ref 96–108)
CO2 SERPL-SCNC: 27 MMOL/L — SIGNIFICANT CHANGE UP (ref 22–31)
CREAT SERPL-MCNC: 0.84 MG/DL — SIGNIFICANT CHANGE UP (ref 0.5–1.3)
EGFR: 75 ML/MIN/1.73M2 — SIGNIFICANT CHANGE UP
EOSINOPHIL # BLD AUTO: 0.2 K/UL — SIGNIFICANT CHANGE UP (ref 0–0.5)
EOSINOPHIL NFR BLD AUTO: 2 % — SIGNIFICANT CHANGE UP (ref 0–6)
GLUCOSE SERPL-MCNC: 105 MG/DL — HIGH (ref 70–99)
HCT VFR BLD CALC: 43.9 % — SIGNIFICANT CHANGE UP (ref 34.5–45)
HGB BLD-MCNC: 14.2 G/DL — SIGNIFICANT CHANGE UP (ref 11.5–15.5)
IMM GRANULOCYTES NFR BLD AUTO: 0.3 % — SIGNIFICANT CHANGE UP (ref 0–0.9)
INR BLD: 1.43 RATIO — HIGH (ref 0.88–1.16)
LYMPHOCYTES # BLD AUTO: 1.95 K/UL — SIGNIFICANT CHANGE UP (ref 1–3.3)
LYMPHOCYTES # BLD AUTO: 19.4 % — SIGNIFICANT CHANGE UP (ref 13–44)
MAGNESIUM SERPL-MCNC: 2.1 MG/DL — SIGNIFICANT CHANGE UP (ref 1.6–2.6)
MCHC RBC-ENTMCNC: 32.2 PG — SIGNIFICANT CHANGE UP (ref 27–34)
MCHC RBC-ENTMCNC: 32.3 GM/DL — SIGNIFICANT CHANGE UP (ref 32–36)
MCV RBC AUTO: 99.5 FL — SIGNIFICANT CHANGE UP (ref 80–100)
MONOCYTES # BLD AUTO: 0.83 K/UL — SIGNIFICANT CHANGE UP (ref 0–0.9)
MONOCYTES NFR BLD AUTO: 8.3 % — SIGNIFICANT CHANGE UP (ref 2–14)
NEUTROPHILS # BLD AUTO: 6.92 K/UL — SIGNIFICANT CHANGE UP (ref 1.8–7.4)
NEUTROPHILS NFR BLD AUTO: 68.8 % — SIGNIFICANT CHANGE UP (ref 43–77)
NRBC # BLD: 0 /100 WBCS — SIGNIFICANT CHANGE UP (ref 0–0)
PLATELET # BLD AUTO: 313 K/UL — SIGNIFICANT CHANGE UP (ref 150–400)
POTASSIUM SERPL-MCNC: 4.1 MMOL/L — SIGNIFICANT CHANGE UP (ref 3.5–5.3)
POTASSIUM SERPL-SCNC: 4.1 MMOL/L — SIGNIFICANT CHANGE UP (ref 3.5–5.3)
PROT SERPL-MCNC: 7.8 G/DL — SIGNIFICANT CHANGE UP (ref 6–8.3)
PROTHROM AB SERPL-ACNC: 16.7 SEC — HIGH (ref 10.5–13.4)
RBC # BLD: 4.41 M/UL — SIGNIFICANT CHANGE UP (ref 3.8–5.2)
RBC # FLD: 13.2 % — SIGNIFICANT CHANGE UP (ref 10.3–14.5)
SARS-COV-2 RNA SPEC QL NAA+PROBE: SIGNIFICANT CHANGE UP
SODIUM SERPL-SCNC: 141 MMOL/L — SIGNIFICANT CHANGE UP (ref 135–145)
TROPONIN T, HIGH SENSITIVITY RESULT: 11 NG/L — SIGNIFICANT CHANGE UP (ref 0–51)
WBC # BLD: 10.05 K/UL — SIGNIFICANT CHANGE UP (ref 3.8–10.5)
WBC # FLD AUTO: 10.05 K/UL — SIGNIFICANT CHANGE UP (ref 3.8–10.5)

## 2022-11-23 PROCEDURE — 83735 ASSAY OF MAGNESIUM: CPT

## 2022-11-23 PROCEDURE — 93010 ELECTROCARDIOGRAM REPORT: CPT

## 2022-11-23 PROCEDURE — 93000 ELECTROCARDIOGRAM COMPLETE: CPT

## 2022-11-23 PROCEDURE — 84484 ASSAY OF TROPONIN QUANT: CPT

## 2022-11-23 PROCEDURE — 71045 X-RAY EXAM CHEST 1 VIEW: CPT

## 2022-11-23 PROCEDURE — 99215 OFFICE O/P EST HI 40 MIN: CPT

## 2022-11-23 PROCEDURE — 82330 ASSAY OF CALCIUM: CPT

## 2022-11-23 PROCEDURE — 99291 CRITICAL CARE FIRST HOUR: CPT | Mod: 25

## 2022-11-23 PROCEDURE — 82947 ASSAY GLUCOSE BLOOD QUANT: CPT

## 2022-11-23 PROCEDURE — U0003: CPT

## 2022-11-23 PROCEDURE — 85610 PROTHROMBIN TIME: CPT

## 2022-11-23 PROCEDURE — 80053 COMPREHEN METABOLIC PANEL: CPT

## 2022-11-23 PROCEDURE — 85025 COMPLETE CBC W/AUTO DIFF WBC: CPT

## 2022-11-23 PROCEDURE — 85018 HEMOGLOBIN: CPT

## 2022-11-23 PROCEDURE — 96375 TX/PRO/DX INJ NEW DRUG ADDON: CPT

## 2022-11-23 PROCEDURE — 93005 ELECTROCARDIOGRAM TRACING: CPT

## 2022-11-23 PROCEDURE — 85730 THROMBOPLASTIN TIME PARTIAL: CPT

## 2022-11-23 PROCEDURE — 99291 CRITICAL CARE FIRST HOUR: CPT | Mod: FS

## 2022-11-23 PROCEDURE — 84132 ASSAY OF SERUM POTASSIUM: CPT

## 2022-11-23 PROCEDURE — 71045 X-RAY EXAM CHEST 1 VIEW: CPT | Mod: 26

## 2022-11-23 PROCEDURE — 83605 ASSAY OF LACTIC ACID: CPT

## 2022-11-23 PROCEDURE — 85014 HEMATOCRIT: CPT

## 2022-11-23 PROCEDURE — 96374 THER/PROPH/DIAG INJ IV PUSH: CPT

## 2022-11-23 PROCEDURE — 84295 ASSAY OF SERUM SODIUM: CPT

## 2022-11-23 PROCEDURE — U0005: CPT

## 2022-11-23 PROCEDURE — 82803 BLOOD GASES ANY COMBINATION: CPT

## 2022-11-23 PROCEDURE — 82435 ASSAY OF BLOOD CHLORIDE: CPT

## 2022-11-23 RX ORDER — METOPROLOL TARTRATE 50 MG
2.5 TABLET ORAL ONCE
Refills: 0 | Status: COMPLETED | OUTPATIENT
Start: 2022-11-23 | End: 2022-11-23

## 2022-11-23 RX ORDER — METOPROLOL TARTRATE 50 MG
12.5 TABLET ORAL ONCE
Refills: 0 | Status: DISCONTINUED | OUTPATIENT
Start: 2022-11-23 | End: 2022-11-23

## 2022-11-23 RX ORDER — METOPROLOL TARTRATE 50 MG
25 TABLET ORAL ONCE
Refills: 0 | Status: COMPLETED | OUTPATIENT
Start: 2022-11-23 | End: 2022-11-23

## 2022-11-23 RX ADMIN — Medication 2.5 MILLIGRAM(S): at 10:11

## 2022-11-23 RX ADMIN — Medication 2.5 MILLIGRAM(S): at 09:28

## 2022-11-23 RX ADMIN — Medication 25 MILLIGRAM(S): at 09:26

## 2022-11-23 NOTE — ED PROVIDER NOTE - CARE PROVIDERS DIRECT ADDRESSES
[email or web address]

## 2022-11-23 NOTE — ED PROVIDER NOTE - CLINICAL SUMMARY MEDICAL DECISION MAKING FREE TEXT BOX
Attending MD Sánchez: 71yo F with PMH of Kobi (on xarelto) presenting with palpitations and SOB onset this morning while on way to routine cardiology appointment.  Physical exam unremarkable.  EKG with rapid atrial fib.  Given lopressor 5mg IV and 25mg PO.  NPO for now for planned cardioversion this afternoon.  Continue Xarelto.

## 2022-11-23 NOTE — ED ADULT NURSE NOTE - OBJECTIVE STATEMENT
Pt is 73yo F with PMH of A.fib (on xarelto) presenting with palpitations and SOB onset this morning while on way to routine cardiology appointment. Patient reports she was feeling well prior. Found to be in rapid A.fib in office and sent to ED for cardioversion. Had cardioversion once in the past. Takes metoprolol 12.5mg once daily, last took yesterday evening along with xarelto. Last ate at 6am, had cereal.  Cardiologist: Dr. Hema Jaffe

## 2022-11-23 NOTE — CONSULT NOTE ADULT - SUBJECTIVE AND OBJECTIVE BOX
CHIEF COMPLAINT: Palpitations    HISTORY OF PRESENT ILLNESS: 73 y/o woman with PAF for ~10 years on Xarelto s/p DCCV 4/2021, HLD, and diverticulosis who presents from her cardiologist office in AF with RVR. She was on her way to see her cardiologist (Dr. Hema Jaffe) when her palpitations began. She was seen in the office and found to be in REGINALDO and sent to the ED for further management. Upon arrival she was AF 150s, now 120s s/p IV/PO Metoprolol. She reports some mild SOB, but otherwise denies any complaints. She does reports occasional palpitations but denies any CP, SOB, dizziness, LH, presyncope or syncope.      Allergies:  No Known Allergies    Intolerances    	    MEDICATIONS:  metoprolol tartrate Injectable 2.5 milliGRAM(s) IV Push Once        PAST MEDICAL & SURGICAL HISTORY:  H/O mitral valve prolapse  echo 2012      H/O irritable bowel syndrome      Diverticula, colon      Paroxysmal A-fib  2012 went to ER, treated with medication, discharged home  on metoprolol since 2012, no recurrence no NOAC      Melanoma in situ      Melanoma in situ  skin graft of lower extremity  10/01/2018  FTSG from the left groin to reconstruct right leg wound s/p wide excision of melanoma in situ.          FAMILY HISTORY:  Mother - esophageal cancer    SOCIAL HISTORY:    Former smoker - 15-20 pk/yr history, quit 1989    REVIEW OF SYSTEMS:  See HPI. Otherwise, 12 point ROS done and otherwise negative.    PHYSICAL EXAM:  T(C): 36.7 (11-23-22 @ 08:53), Max: 36.7 (11-23-22 @ 08:53)  HR: 120 (11-23-22 @ 08:53) (120 - 120)  BP: 151/108 (11-23-22 @ 08:53) (151/108 - 151/108)  RR: 20 (11-23-22 @ 08:53) (20 - 20)  SpO2: 98% (11-23-22 @ 08:53) (98% - 98%)  Wt(kg): --  I&O's Summary      Appearance: Normal	  HEENT:  PERRL, EOMI	  Cardiovascular: Normal S1 S2, irreg, tachy, no murmurs, No edema  Respiratory: Lungs clear to auscultation	  Psychiatry: A & O x 3, Mood & affect appropriate  Gastrointestinal:  Soft, Non-tender, + BS	  Skin: No rashes, No ecchymoses, No cyanosis	  Neurologic: Non-focal  Extremities: No clubbing, cyanosis or edema  Vascular: Peripheral pulses palpable 2+ bilaterally        LABS:	 	    CBC Full  -  ( 23 Nov 2022 09:23 )  WBC Count : 10.05 K/uL  Hemoglobin : 14.2 g/dL  Hematocrit : 43.9 %  Platelet Count - Automated : 313 K/uL  Mean Cell Volume : 99.5 fl  Mean Cell Hemoglobin : 32.2 pg  Mean Cell Hemoglobin Concentration : 32.3 gm/dL  Auto Neutrophil # : 6.92 K/uL  Auto Lymphocyte # : 1.95 K/uL  Auto Monocyte # : 0.83 K/uL  Auto Eosinophil # : 0.20 K/uL  Auto Basophil # : 0.12 K/uL  Auto Neutrophil % : 68.8 %  Auto Lymphocyte % : 19.4 %  Auto Monocyte % : 8.3 %  Auto Eosinophil % : 2.0 %  Auto Basophil % : 1.2 %    11-23    141  |  102  |  16  ----------------------------<  105<H>  4.1   |  27  |  0.84    Ca    9.8      23 Nov 2022 09:23  Mg     2.1     11-23    TPro  7.8  /  Alb  4.7  /  TBili  0.4  /  DBili  x   /  AST  30  /  ALT  27  /  AlkPhos  118  11-23      TELEMETRY: -150s	      < from: Transesophageal Echocardiogram (04.20.21 @ 10:25) >  ROCEDURE: Transesophageal and transthoracic  echocardiograms with 2-D, M-Mode and complete spectraland  color flow Doppler were performed.  Informed consent was  first obtained for LUIS ENRIQUE. The patient was sedated - see  anesthesia record.  The procedure was monitored with  automatic blood pressure monitoring, ECG tracings and pulse  oximetry.  The transesophageal probe was placed in the  esophagus posterior to the heart without complications.  INDICATION: Paroxysmal atrial fibrillation (I48.0)  ------------------------------------------------------------------------  Dimensions:    Normal Values:  LA:            2.0 - 4.0 cm  Ao:     3.4    2.0 - 3.8 cm  SEPTUM: 0.4    0.6 - 1.2 cm  PWT:    0.5    0.6 - 1.1 cm  LVIDd:  5.3    3.0 - 5.6 cm  LVIDs:  4.1    1.8 - 4.0 cm  Derived variables:  LVMI: 42 g/m2  RWT: 0.18  Fractional short: 22 %  EF (Visual Estimate): 45 %  ------------------------------------------------------------------------  Observations:  Mitral Valve: Normal mitral valve. Mild mitral  regurgitation.  Aortic Valve/Aorta: Normal trileaflet aortic valve. No  aortic valve regurgitation seen.  Aortic Root: 3.4 cm.  Left Atrium: Normal left atrium. No left atrial or left  atrial appendage thrombus.  Left Ventricle: Mild global left ventricular systolic  dysfunction. Normal left ventricular internal dimensions  and wall thicknesses. Unable to assess diastolic function  in the presence of atrial fibrillation.  Right Heart: Normal right atrium. No right atrial or right  atrial appendage thrombus. Normal right ventricular size  and function. Normal tricuspid valve. Moderate tricuspid  regurgitation. Normal pulmonic valve. Minimal pulmonic  regurgitation.  Pericardium/Pleura: Normal pericardium with no pericardial  effusion.  Hemodynamic: Estimated right atrial pressure is 8 mm Hg.  Estimated right ventricular systolic pressure equals 33 mm  Hg, assuming right atrial pressure equals 8 mm Hg,  consistent with normal pulmonary pressures. Color Doppler  demonstrates no evidence of a patent foramen ovale.  ------------------------------------------------------------------------  Conclusions:  1. Normal mitral valve. Mild mitral regurgitation.  2. Normal trileaflet aortic valve. No aortic valve  regurgitation seen.  3. Normal left atrium. No left atrial or left atrial  appendage thrombus.  4. Mild global left ventricularsystolic dysfunction.  5. Normal right ventricular size and function.  6. Normal tricuspid valve. Moderate tricuspid  regurgitation.  7. Estimated pulmonary artery systolic pressure equals 33  mm Hg, assuming right atrial pressure equals 8  mm Hg,  consistent with normal pulmonary pressures.  8. Color Doppler demonstrates no evidence of a patent  foramen ovale.  *** Compared with echocardiogram of 6/20/2012, no  significant changes noted.  Consider limited TTE to evaluate left ventricular systolic  function and the degree of Tricuspid regurgitation  following return to sinus rhythm.    < end of copied text >    < from: Transthoracic Echocardiogram (04.20.21 @ 07:52) >  PROCEDURE: Transthoracic echocardiogram with 2-D, M-Mode  and complete spectral and color flow Doppler.  INDICATION: Unspecified atrial fibrillation (I48.91)  ------------------------------------------------------------------------  Dimensions:    Normal Values:  LA:     3.0    2.0 - 4.0 cm  Ao:     3.4    2.0 - 3.8 cm  SEPTUM: 0.7    0.6 - 1.2 cm  PWT:    0.7    0.6 - 1.1 cm  LVIDd:  5.3    3.0 - 5.6 cm  LVIDs:  4.1    1.8 - 4.0 cm  Derived variables:  LVMI: 71 g/m2  RWT: 0.26  Fractional short: 22 %  EF (Visual Estimate): 45-50 %  Doppler Peak Velocity (m/sec): AoV=1.1  ------------------------------------------------------------------------  Observations:  Mitral Valve: Normal mitral valve. Minimal mitral  regurgitation.  Aortic Valve/Aorta: Aortic valve not well visualized;  appears trileaflet with normal opening. Peak transaortic  valve gradient equals 5 mm Hg. No aortic valve  regurgitation seen. Peak left ventricular outflow tract  gradient equals 2 mm Hg.  Aortic Root: 3.4 cm.  Left Atrium: Normal left atrium.  LA volume index = 27  cc/m2.  Left Ventricle: Patient in atrial fibrillation; ejection  fraction varies with R-R interval. Endocardium not well  visualized; grossly mild left ventricular systolic  dysfunction. The inferior wall appears hypokinetic. Limited  repeat imaging could be performed with intravenous echo  contrast to better visualize the LV if clinically  indicated. Normal left ventricular internal dimensions and  wall thicknesses.  Right Heart: Right atrium not well visualized, probably  normal. The right ventricle is not well visualized; grossly  normal right ventricular systolic function. Normal  tricuspid valve. Moderate tricuspid regurgitation. Pulmonic  valve not well visualized, probably normal. Minimal  pulmonic regurgitation.  Pericardium/Pleura: Normal pericardium with no pericardial  effusion.  Hemodynamic: Estimated right atrial pressure is 8 mm Hg.  Estimated right ventricular systolic pressure equals 24 mm  Hg, assuming right atrial pressure equals 8 mm Hg,  consistent with normal pulmonary pressures.  ------------------------------------------------------------------------  Conclusions:  1. Normal mitral valve. Minimal mitral regurgitation.  2. Aortic valve not well visualized; appears trileaflet  with normal opening. No aortic valve regurgitation seen.  3. Patient in atrial fibrillation; ejection fraction varies  with R-R interval. Endocardium not well visualized; grossly  mild left ventricular systolic dysfunction. The inferior  wall appears hypokinetic. Limited repeat imaging could be  performed with intravenous echo contrast to better  visualize the LV if clinically indicated.  4. The right ventricle is not well visualized; grossly  normal right ventricular systolic function.    < end of copied text >  	     CHIEF COMPLAINT: Palpitations    HISTORY OF PRESENT ILLNESS: 73 y/o woman with PAF for ~10 years on Xarelto s/p DCCV 4/2021, HLD, and diverticulosis who presents from her cardiologist office in AF with RVR. She was on her way to see her cardiologist (Dr. Hema Jaffe) when her palpitations began. She was seen in the office and found to be in REGINALDO and sent to the ED for further management. Upon arrival she was AF 150s, now 120s s/p IV/PO Metoprolol. She reports some mild SOB, but otherwise denies any complaints. She does reports occasional palpitations but denies any CP, SOB, dizziness, LH, presyncope or syncope.    Allergies: No Known Allergies	    MEDICATIONS:  metoprolol tartrate Injectable 2.5 milliGRAM(s) IV Push Once    PAST MEDICAL & SURGICAL HISTORY:  H/O mitral valve prolapse  echo 2012  H/O irritable bowel syndrome  Diverticula, colon  Paroxysmal A-fib  2012 went to ER, treated with medication, discharged home  on metoprolol since 2012, no recurrence no NOAC  Melanoma in situ  Melanoma in situ  skin graft of lower extremity  10/01/2018  FTSG from the left groin to reconstruct right leg wound s/p wide excision of melanoma in situ.    FAMILY HISTORY: Mother - esophageal cancer    SOCIAL HISTORY:    Former smoker - 15-20 pk/yr history, quit 1989    REVIEW OF SYSTEMS: See HPI. Otherwise, 12 point ROS done and otherwise negative.    PHYSICAL EXAM:  T(C): 36.7 (11-23-22 @ 08:53), Max: 36.7 (11-23-22 @ 08:53)  HR: 120 (11-23-22 @ 08:53) (120 - 120)  BP: 151/108 (11-23-22 @ 08:53) (151/108 - 151/108)  RR: 20 (11-23-22 @ 08:53) (20 - 20)  SpO2: 98% (11-23-22 @ 08:53) (98% - 98%)    Appearance: Normal	  HEENT:  PERRL, EOMI	  Cardiovascular: Normal S1 S2, irreg, tachy, no murmurs, No edema  Respiratory: Lungs clear to auscultation	  Psychiatry: A & O x 3, Mood & affect appropriate  Gastrointestinal:  Soft, Non-tender, + BS	  Skin: No rashes, No ecchymoses, No cyanosis	  Neurologic: Non-focal  Extremities: No clubbing, cyanosis or edema  Vascular: Peripheral pulses palpable 2+ bilaterally    LABS:	 	    CBC Full  -  ( 23 Nov 2022 09:23 )  WBC Count : 10.05 K/uL  Hemoglobin : 14.2 g/dL  Hematocrit : 43.9 %  Platelet Count - Automated : 313 K/uL  Mean Cell Volume : 99.5 fl  Mean Cell Hemoglobin : 32.2 pg  Mean Cell Hemoglobin Concentration : 32.3 gm/dL  Auto Neutrophil # : 6.92 K/uL  Auto Lymphocyte # : 1.95 K/uL  Auto Monocyte # : 0.83 K/uL  Auto Eosinophil # : 0.20 K/uL  Auto Basophil # : 0.12 K/uL  Auto Neutrophil % : 68.8 %  Auto Lymphocyte % : 19.4 %  Auto Monocyte % : 8.3 %  Auto Eosinophil % : 2.0 %  Auto Basophil % : 1.2 %    11-23    141  |  102  |  16  ----------------------------<  105<H>  4.1   |  27  |  0.84    Ca    9.8      23 Nov 2022 09:23  Mg     2.1     11-23    TPro  7.8  /  Alb  4.7  /  TBili  0.4  /  DBili  x   /  AST  30  /  ALT  27  /  AlkPhos  118  11-23    TELEMETRY: -150s	      < from: Transesophageal Echocardiogram (04.20.21 @ 10:25) >  Dimensions:    Normal Values:  LA:            2.0 - 4.0 cm  Ao:     3.4    2.0 - 3.8 cm  SEPTUM: 0.4    0.6 - 1.2 cm  PWT:    0.5    0.6 - 1.1 cm  LVIDd:  5.3    3.0 - 5.6 cm  LVIDs:  4.1    1.8 - 4.0 cm  Derived variables:  LVMI: 42 g/m2  RWT: 0.18  Fractional short: 22 %  EF (Visual Estimate): 45 %  ------------------------------------------------------------------------  Observations: Mitral Valve: Normal mitral valve. Mild mitral regurgitation. Aortic Valve/Aorta: Normal trileaflet aortic valve. No aortic valve regurgitation seen. Aortic Root: 3.4 cm. Left Atrium: Normal left atrium. No left atrial or left atrial appendage thrombus. Left Ventricle: Mild global left ventricular systolic dysfunction. Normal left ventricular internal dimensions and wall thicknesses. Unable to assess diastolic function in the presence of atrial fibrillation. Right Heart: Normal right atrium. No right atrial or right atrial appendage thrombus. Normal right ventricular size and function. Normal tricuspid valve. Moderate tricuspid regurgitation. Normal pulmonic valve. Minimal pulmonic regurgitation. Pericardium/Pleura: Normal pericardium with no pericardial effusion. Hemodynamic: Estimated right atrial pressure is 8 mm Hg. Estimated right ventricular systolic pressure equals 33 mm Hg, assuming right atrial pressure equals 8 mm Hg, consistent with normal pulmonary pressures. Color Doppler demonstrates no evidence of a patent foramen ovale. - Conclusions: 1. Normal mitral valve. Mild mitral regurgitation. 2. Normal trileaflet aortic valve. No aortic valve regurgitation seen. 3. Normal left atrium. No left atrial or left atrial appendage thrombus. 4. Mild global left ventricular systolic dysfunction. 5. Normal right ventricular size and function. 6. Normal tricuspid valve. Moderate tricuspid regurgitation. 7. Estimated pulmonary artery systolic pressure equals 33 mm Hg, assuming right atrial pressure equals 8  mm Hg, consistent with normal pulmonary pressures. 8. Color Doppler demonstrates no evidence of a patent foramen ovale. *** Compared with echocardiogram of 6/20/2012, no significant changes noted. Consider limited TTE to evaluate left ventricular systolic function and the degree of Tricuspid regurgitation following return to sinus rhythm.  < end of copied text >    < from: Transthoracic Echocardiogram (04.20.21 @ 07:52) >  PROCEDURE: Transthoracic echocardiogram with 2-D, M-Mode  and complete spectral and color flow Doppler.  INDICATION: Unspecified atrial fibrillation (I48.91)  ------------------------------------------------------------------------  Dimensions:    Normal Values:  LA:     3.0    2.0 - 4.0 cm  Ao:     3.4    2.0 - 3.8 cm  SEPTUM: 0.7    0.6 - 1.2 cm  PWT:    0.7    0.6 - 1.1 cm  LVIDd:  5.3    3.0 - 5.6 cm  LVIDs:  4.1    1.8 - 4.0 cm  Derived variables:  LVMI: 71 g/m2  RWT: 0.26  Fractional short: 22 %  EF (Visual Estimate): 45-50 %  Doppler Peak Velocity (m/sec): AoV=1.1  ------------------------------------------------------------------------  Observations: Mitral Valve: Normal mitral valve. Minimal mitral regurgitation. Aortic Valve/Aorta: Aortic valve not well visualized; appears trileaflet with normal opening. Peak transaortic valve gradient equals 5 mm Hg. No aortic valve regurgitation seen. Peak left ventricular outflow tract gradient equals 2 mm Hg. Aortic Root: 3.4 cm. Left Atrium: Normal left atrium.  LA volume index = 27 cc/m2. Left Ventricle: Patient in atrial fibrillation; ejection fraction varies with R-R interval. Endocardium not well visualized; grossly mild left ventricular systolic dysfunction. The inferior wall appears hypokinetic. Limited repeat imaging could be performed with intravenous echo contrast to better visualize the LV if clinically indicated. Normal left ventricular internal dimensions and wall thicknesses. Right Heart: Right atrium not well visualized, probably normal. The right ventricle is not well visualized; grossly normal right ventricular systolic function. Normal tricuspid valve. Moderate tricuspid regurgitation. Pulmonic valve not well visualized, probably normal. Minimal pulmonic regurgitation. Pericardium/Pleura: Normal pericardium with no pericardial effusion. Hemodynamic: Estimated right atrial pressure is 8 mm Hg. Estimated right ventricular systolic pressure equals 24 mm Hg, assuming right atrial pressure equals 8 mm Hg, consistent with normal pulmonary pressures. Conclusions: 1. Normal mitral valve. Minimal mitral regurgitation. 2. Aortic valve not well visualized; appears trileaflet with normal opening. No aortic valve regurgitation seen. 3. Patient in atrial fibrillation; ejection fraction varies with R-R interval. Endocardium not well visualized; grossly mild left ventricular systolic dysfunction. The inferior wall appears hypokinetic. Limited repeat imaging could be performed with intravenous echo contrast to better visualize the LV if clinically indicated. 4. The right ventricle is not well visualized; grossly normal right ventricular systolic function.  < end of copied text >

## 2022-11-23 NOTE — ED ADULT NURSE NOTE - SEPSIS REFERENCE DATA CRITERIA 1
MDO received to check cost of Xarelto. Xarelto is fully covered by insurance, no co-pay for patient. Medication is ready to be picked up at pt's [facility]. Jaison YANG updated.     Britni Polanco MSN RN, [facility]  P: [phone] Abormal VS: Temp > 100F or < 96.8F; SBP < 90 mmHG; HR > 120bpm; Resp > 24/min

## 2022-11-23 NOTE — ED ADULT TRIAGE NOTE - AS HEIGHT TYPE
11/6/2020                            Maurice Tim Jr.  [address] [facility] [address]    To Whom It May Concern:    This is to certify Maurice Tim Jr. was evaluated with TIM Mcginnis on 11/6/2020 and can return to regular work on 11/10/2020.     RESTRICTIONS: None              TIM Mcginnis  [place] [id number] [facility] [address]  Phone: [phone]     stated

## 2022-11-23 NOTE — ED PROVIDER NOTE - CRITICAL CARE ATTENDING CONTRIBUTION TO CARE
Attending MD Sánchez:   I personally have seen and examined this patient.  Physician assistant note reviewed and agree on plan of care and except where noted.  Please see my MDM for further details. The patient's condition is critical. Management options were put in place to ensure further deterioration does not occur. In addition to the usual care provided, I have spent additional time with this patient through but not limited to the following: additional documentation  reviewing test results,  discussing with consultants,  discussing with patient / patient's family prognosis and course of care,  reassessment of patient's status and response to interventions.

## 2022-11-23 NOTE — CONSULT NOTE ADULT - NS ATTEND AMEND GEN_ALL_CORE FT
Patient seen at bedside in ED. Discussed case with Dr. Hema Jaffe. The patient converted to sinus in the ED. We discussed rhythm control options. She is a poor candidate for AAD due to slow sinus rates. We discussed the role of a catheter ablation. I recommended that she follow-up with me in the office.    JANINA Gutierrez

## 2022-11-23 NOTE — ED PROVIDER NOTE - NSFOLLOWUPINSTRUCTIONS_ED_ALL_ED_FT
Rest. Stay well hydrated.   Take all of your other medications as previously prescribed.     Please follow up with your Primary Care Provider and Cardiologist Dr. Jaffe in office this week for further evaluation and management.    Show copies of your reports given to you.      Return to ER for any chest pain, palpitations, shortness of breath, weakness, passing out, or any other concerning symptoms.

## 2022-11-23 NOTE — ED PROVIDER NOTE - CARE PROVIDER_API CALL
All Jaffe)  Cardiovascular Disease; Internal Medicine  [address] [place] [address]  Phone: [phone]  Fax: [phone]  Follow Up Time: 1-3 Days

## 2022-11-23 NOTE — ED PROVIDER NOTE - OBJECTIVE STATEMENT
73yo F with PMH of Kobi (on xarelto) presenting with palpitaitons and SOB onset this morning while on way to routine cardiology appointment. Patient     Takes metoprolol 12.5mg once daily, last took yesterday evening along with xarelto. 71yo F with PMH of AMRIK.fib (on xarelto) presenting with palpitations and SOB onset this morning while on way to routine cardiology appointment. Patient reports she was feeling well prior. Found to be in rapid A.fib in office and sent to ED for cardioversion. Had cardioversion once in the past. Takes metoprolol 12.5mg once daily, last took yesterday evening along with xarelto. Last ate at 6am, had cereal. Denies any fever/chills, recent illness, cough, chest pain, abdominal pain, n/v/d, urinary symptoms, LE pain/swelling.   Cardiologist: Dr. Hema Jaffe

## 2022-11-23 NOTE — CONSULT NOTE ADULT - ASSESSMENT
73 y/o woman with PAF for ~10 years on Xarelto s/p DCCV 4/2021, HLD, and diverticulosis who presents from her cardiologist office in AF with RVR.     1. AF    - -150s  - Patient has been compliant with Xarelto  - Maintain NPO for DCCV today after 2 PM, ate at 6 AM  - Continue Metoprolol  - Consider AAD post cardioversion   73 y/o woman with PAF for ~10 years on Xarelto s/p DCCV 4/2021, HLD, and diverticulosis who presents from her cardiologist office in AF with RVR.     1. AF    - AF 120s when seen, but then self converted to SR 60s  - Continue Xarelto 20 mg daily  - Discussed outpatient ablation which patient will discuss further with family and f/u   - Will hold off on AAD at this time given history of sinus bradycardia  - Continue Metoprolol 12.5 mg daily  - No EP contraindication to discharge  - Discussed with ED team

## 2022-11-23 NOTE — ED PROVIDER NOTE - PATIENT PORTAL LINK FT
You can access the FollowMyHealth Patient Portal offered by [facility] by registering at the following website: [email or web address]. By joining [employer]’s FollowMyHealth portal, you will also be able to view your health information using other applications (apps) compatible with our system.

## 2022-11-23 NOTE — ED ADULT TRIAGE NOTE - CHIEF COMPLAINT QUOTE
Found to be in atrial fibrillation with RVR at cardiologist office; +palpitations and SOB. ECG showed rate in 140s. Sent in for cardioversion

## 2022-12-04 NOTE — ASU PATIENT PROFILE, ADULT - MEDICATION HERBAL REMEDIES, PROFILE
[facility] History & Physical    Patient: Agata Bruce   MRN: [id number]  Age: 73 year old  Sex: female  : 1949   Author: Lola Mejía CNP     PCP: Sho Rocha MD     Cc: Chest pain    HPI: This is a 73 year old year old female with past medical history significant for hypertension, hyperlipidemia, GERD who presented to the ED with complaints of chest pain.    Patient reports intermittent chest pain over the last 2 days.  Describes it as tightness/pressure in the center of her chest.  Denies any radiation of pain.  Denies any worsening of pain with exertion or deep inspiration.  Denies any associated shortness of breath, dizziness, palpitations, nausea, or diaphoresis.    In the ER, VS T97.8, HR 63, R 16, /90, SPO2 100%.  Labs were obtained and essentially unremarkable.  Troponin enzyme was negative x1.  Chest x-ray was obtained report is unavailable.  Patient was admitted as CPP with cardiology on consult.  Patient is established with Dr. Umaña, therefore consult was changed to Dr. Caban who was notified.    Review of Systems  Eye Problem(s):negative  ENT Problem(s):negative  Cardiovascular problem(s):chest pain  Respiratory problem(s):negative  Gastro-intestinal problem(s):negative GI  Genito-urinary problem(s):negative  Musculoskeletal problem(s):negative  Integumentary problem(s):negative  Neurological problem(s):negative  Psychiatric problem(s):negative  Endocrine problem(s):negative  Hematologic and/or Lymphatic problem(s):negative     Past Medical History  Past Medical History:   Diagnosis Date   • Abnormal ultrasound of breast 07/10/2017   • Benign essential HTN 2017   • Chronic pain of both hips 02/15/2019   • SWETHA (generalized anxiety disorder) 2016   • Hyperlipoproteinemia    • Mild sleep apnea    • Osteopenia of left femoral neck 2018   • Rhinitis, chronic 2016   • Thoracic aortic atherosclerosis (CMS/HCC) 2017    On CXR   • Vitamin D deficiency  2016        Surgical History  Past Surgical History:   Procedure Laterality Date   • Tonsillectomy     • Tubal ligation          Social History  Social History     Tobacco Use   • Smoking status: Former Smoker     Packs/day: 1.50     Years: 10.00     Pack years: 15.00     Types: Cigarettes     Quit date:      Years since quittin.9   • Smokeless tobacco: Never Used   Vaping Use   • Vaping Use: never used   Substance Use Topics   • Alcohol use: Yes     Alcohol/week: 4.0 standard drinks     Types: 4 Glasses of wine per week   • Drug use: No       Family History  Family History   Problem Relation Age of Onset   • Cancer, Breast Mother    • COPD Father    • Cancer, Prostate Brother    • Cancer Half-Brother    • Cancer, Lung Half-Brother    • Cancer Half-Brother    • Natural Causes Half-Brother    • Cancer, Prostate Half-Brother    • Dementia/Alzheimers Half-Brother         Allergies  ALLERGIES:  Patient has no known allergies.    Medications  Medications Prior to Admission   Medication Sig Dispense Refill   • omeprazole (PrilOSEC) 20 MG capsule Take 1 capsule by mouth daily. 90 capsule 1   • nystatin (MYCOSTATIN) 205785 UNIT/GM ointment APPLY TO THE AFFECTED AREA 3 TO 4 TIMES DAILY AS NEEDED     • PARoxetine (PAXIL) 10 MG tablet Take 1 tablet by mouth daily. As directed 90 tablet 3   • pravastatin (PRAVACHOL) 40 MG tablet Take 1 tablet by mouth at bedtime. 90 tablet 3   • lisinopril (ZESTRIL) 10 MG tablet TAKE 1 TABLET BY MOUTH  DAILY 90 tablet 3   • metoPROLOL succinate (TOPROL-XL) 25 MG 24 hr tablet Take 1 tablet by mouth at bedtime. 90 tablet 3   • ipratropium (ATROVENT) 0.06 % nasal spray as needed.      • Multiple Vitamin (MULTI-VITAMIN) tablet Take by mouth at bedtime.      • Misc Natural Products (OSTEO BI-FLEX ADV DOUBLE ST) Tab Take by mouth 2 times daily.      • calcium carbonate-vitamin D (CALTRATE+D) 600-400 MG-UNIT per tablet Take 2 tablets by mouth every 12 hours.      • Multiple  Vitamins-Minerals (OCUVITE ADULT 50+ PO) Take by mouth daily.          Current medications  Current Facility-Administered Medications   Medication   • sodium chloride 0.9 % flush bag 25 mL   • sodium chloride (PF) 0.9 % injection 2 mL       Physical Exam  Physical Exam  Vitals and nursing note reviewed.   Constitutional:       General: She is not in acute distress.     Appearance: She is well-developed.   HENT:      Head: Normocephalic and atraumatic.   Eyes:      Conjunctiva/sclera: Conjunctivae normal.   Cardiovascular:      Rate and Rhythm: Normal rate and regular rhythm.      Heart sounds: Normal heart sounds.   Pulmonary:      Effort: Pulmonary effort is normal. No respiratory distress.      Breath sounds: Normal breath sounds.   Chest:      Chest wall: No tenderness.   Abdominal:      General: Bowel sounds are normal.      Palpations: Abdomen is soft.      Tenderness: There is no abdominal tenderness.   Musculoskeletal:         General: Normal range of motion.      Cervical back: Neck supple.   Skin:     General: Skin is warm and dry.   Neurological:      Mental Status: She is alert and oriented to person, place, and time.   Psychiatric:         Behavior: Behavior normal.         Thought Content: Thought content normal.              OBJECTIVE:      VITAL SIGNS:    Vital Last Value 24 Hour Range   Temperature 97.8 °F (36.6 °C) (12/04/22 0907) Temp  Min: 97.8 °F (36.6 °C)  Max: 97.8 °F (36.6 °C)   Pulse 67 (12/04/22 1000) Pulse  Min: 63  Max: 67   Respiratory 16 (12/04/22 1000) Resp  Min: 16  Max: 16   Non-Invasive  Blood Pressure (!) 168/83 (12/04/22 1000) BP  Min: 164/90  Max: 168/83   Pulse Oximetry 97 % (12/04/22 1000) SpO2  Min: 97 %  Max: 100 %     Vital Today Admitted   Weight 67.1 kg (148 lb) (12/04/22 0907) Weight: 67.1 kg (148 lb) (12/04/22 0907)   Height N/A Height: 5' 5\" (165.1 cm) (12/04/22 0907)   Body Mass Index N/A BMI (Calculated): 24.63 (12/04/22 0907)     LABS:   Recent Results (from the past  24 hour(s))   Comprehensive Metabolic Panel    Collection Time: 12/04/22  9:11 AM   Result Value Ref Range    Fasting Status      Sodium 142 135 - 145 mmol/L    Potassium 4.3 3.4 - 5.1 mmol/L    Chloride 106 97 - 110 mmol/L    Carbon Dioxide 30 21 - 32 mmol/L    Anion Gap 10 7 - 19 mmol/L    Glucose 90 70 - 99 mg/dL    BUN 15 6 - 20 mg/dL    Creatinine 0.81 0.51 - 0.95 mg/dL    Glomerular Filtration Rate 78 >=60    BUN/ Creatinine Ratio 19 7 - 25    Calcium 8.5 8.4 - 10.2 mg/dL    Bilirubin, Total 1.1 (H) 0.2 - 1.0 mg/dL    GOT/AST 18 <=37 Units/L    GPT/ALT 23 <64 Units/L    Alkaline Phosphatase 46 45 - 117 Units/L    Albumin 3.6 3.6 - 5.1 g/dL    Protein, Total 6.9 6.4 - 8.2 g/dL    Globulin 3.3 2.0 - 4.0 g/dL    A/G Ratio 1.1 1.0 - 2.4   TROPONIN I, HIGH SENSITIVITY    Collection Time: 12/04/22  9:11 AM   Result Value Ref Range    Troponin I, High Sensitivity 6 <52 ng/L   CBC with Automated Differential (performable only)    Collection Time: 12/04/22  9:11 AM   Result Value Ref Range    WBC 5.8 4.2 - 11.0 K/mcL    RBC 4.64 4.00 - 5.20 mil/mcL    HGB 13.3 12.0 - 15.5 g/dL    HCT 42.6 36.0 - 46.5 %    MCV 91.8 78.0 - 100.0 fl    MCH 28.7 26.0 - 34.0 pg    MCHC 31.2 (L) 32.0 - 36.5 g/dL    RDW-CV 13.2 11.0 - 15.0 %    RDW-SD 44.8 39.0 - 50.0 fL     140 - 450 K/mcL    NRBC 0 <=0 /100 WBC    Neutrophil, Percent 57 %    Lymphocytes, Percent 32 %    Mono, Percent 9 %    Eosinophils, Percent 1 %    Basophils, Percent 1 %    Immature Granulocytes 0 %    Absolute Neutrophils 3.4 1.8 - 7.7 K/mcL    Absolute Lymphocytes 1.9 1.0 - 4.0 K/mcL    Absolute Monocytes 0.5 0.3 - 0.9 K/mcL    Absolute Eosinophils  0.1 0.0 - 0.5 K/mcL    Absolute Basophils 0.0 0.0 - 0.3 K/mcL    Absolute Immmature Granulocytes 0.0 0.0 - 0.2 K/mcL   COVID/Flu/RSV panel    Collection Time: 12/04/22  9:12 AM   Result Value Ref Range    Rapid SARS-COV-2 by PCR Not Detected Not Detected / Detected / Presumptive Positive / Inhibitors present     Influenza A by PCR Not Detected Not Detected    Influenza B by PCR Not Detected Not Detected    RSV BY PCR Not Detected Not Detected    Isolation Guidelines      Procedural Comment          IMAGING:  LAST X-RAY:  === 12/04/22 ===    XR CHEST PA OR AP 1 VIEW    - Narrative -  EXAM: XR CHEST PA OR AP 1 VIEW    CLINICAL INDICATION: Chest pain    COMPARISON: Chest x-ray 08/31/2021    - Impression -  FINDINGS AND IMPRESSION:    Cardiomediastinal silhouette is normal in size and contour.  Aortic  calcification.  No focal consolidation, pleural effusion, or detectable  pneumothorax.    Electronically Signed by: SHWETHA JENKINS M.D.  Signed on: 12/4/2022 11:12 AM        PROBLEM LIST:    Patient Active Problem List   Diagnosis   • Thoracic aortic atherosclerosis (CMS/HCC)   • Benign essential HTN   • Dense breasts   • SWETHA (generalized anxiety disorder)   • Osteopenia of left femoral neck   • Rhinitis, chronic   • History of vitamin D deficiency   • Chronic pain of both hips   • Wellness examination   • Hyperbilirubinemia   • Palpitation   • Neck pain on right side   • Nonrheumatic aortic valve insufficiency   • Pulmonary hypertension (CMS/HCC)   • Pharyngitis   • Bursitis of right hip   • Incidental lung nodule, > 3mm and < 8mm   • Diaphoresis   • Dermatitis due to unknown cause   • ELLYN (obstructive sleep apnea)   • Chest pain, unspecified type       HEART SCORE:   > 65 years     2  Normal                                                   0  Moderate suspicious     1  > 3 risk factors, or history of atherosclerotic disease     2  < Normal limit     0    ASSESSMENT/PLAN:     Atypical Chest pain  -CXR negative  -EKG NSR, no acute ST elevation  -Troponin enzyme negative, obtain repeat  -Flu/Covid/RSV negative  -Heart Score: 5  -S/P ASA and NTG in ER  -Cardiology, Dr. Yates consulted, patient follows Dr. Umaña as otpt- will obtain stress test, if negative can DC home  -Telemetry monitoring    Hypertension  -Currently  hypertensive  -Continue Lisinopril and Metoprolol  -Monitor BP    Hyperlipidemia  -Continue Pravastatin    Anxiety  -Continue Paroxetine    GERD  -Continue PPI    ELLYN  -Newly diagnosed, currently being fitted for dental appliance by dentist        DVT Prophylaxis: Lovenox  GI Prophylaxis: PPI    Plan: As outlined above.  Discussed with patient and attending physician Dr. Carbajal  Disposition: Per clinical course  PCP: MD Lola Shafer, CNP  [facility], [place]  [phone]     no

## 2022-12-08 ENCOUNTER — APPOINTMENT (OUTPATIENT)
Dept: CARDIOLOGY | Facility: CLINIC | Age: 72
End: 2022-12-08
Payer: MEDICARE

## 2022-12-08 VITALS
DIASTOLIC BLOOD PRESSURE: 62 MMHG | HEART RATE: 60 BPM | BODY MASS INDEX: 23.95 KG/M2 | OXYGEN SATURATION: 98 % | SYSTOLIC BLOOD PRESSURE: 120 MMHG | WEIGHT: 158 LBS | HEIGHT: 68 IN

## 2022-12-08 PROCEDURE — 99215 OFFICE O/P EST HI 40 MIN: CPT

## 2023-01-10 ENCOUNTER — RESULT REVIEW (OUTPATIENT)
Age: 73
End: 2023-01-10

## 2023-01-10 ENCOUNTER — APPOINTMENT (OUTPATIENT)
Dept: ULTRASOUND IMAGING | Facility: CLINIC | Age: 73
End: 2023-01-10
Payer: MEDICARE

## 2023-01-10 ENCOUNTER — OUTPATIENT (OUTPATIENT)
Dept: OUTPATIENT SERVICES | Facility: HOSPITAL | Age: 73
LOS: 1 days | End: 2023-01-10
Payer: MEDICARE

## 2023-01-10 DIAGNOSIS — D03.9 MELANOMA IN SITU, UNSPECIFIED: Chronic | ICD-10-CM

## 2023-01-10 DIAGNOSIS — Z00.8 ENCOUNTER FOR OTHER GENERAL EXAMINATION: ICD-10-CM

## 2023-01-10 PROCEDURE — 93880 EXTRACRANIAL BILAT STUDY: CPT | Mod: 26

## 2023-01-10 PROCEDURE — 93880 EXTRACRANIAL BILAT STUDY: CPT

## 2023-03-26 ENCOUNTER — NON-APPOINTMENT (OUTPATIENT)
Age: 73
End: 2023-03-26

## 2023-06-02 NOTE — ED ADULT NURSE NOTE - IS THE PATIENT ABLE TO BE SCREENED?
The right neck was prepped. The site was prepped with ChloraPrep. The patient was draped. The patient was positioned supine. Yes

## 2023-09-06 ENCOUNTER — APPOINTMENT (OUTPATIENT)
Dept: ORTHOPEDIC SURGERY | Facility: CLINIC | Age: 73
End: 2023-09-06
Payer: MEDICARE

## 2023-09-06 ENCOUNTER — NON-APPOINTMENT (OUTPATIENT)
Age: 73
End: 2023-09-06

## 2023-09-06 VITALS
HEART RATE: 62 BPM | SYSTOLIC BLOOD PRESSURE: 177 MMHG | DIASTOLIC BLOOD PRESSURE: 81 MMHG | HEIGHT: 68 IN | WEIGHT: 160 LBS | BODY MASS INDEX: 24.25 KG/M2

## 2023-09-06 DIAGNOSIS — M89.8X9 OTHER SPECIFIED DISORDERS OF BONE, UNSPECIFIED SITE: ICD-10-CM

## 2023-09-06 DIAGNOSIS — M25.522 PAIN IN LEFT ELBOW: ICD-10-CM

## 2023-09-06 PROCEDURE — 73080 X-RAY EXAM OF ELBOW: CPT | Mod: LT

## 2023-09-06 PROCEDURE — 99203 OFFICE O/P NEW LOW 30 MIN: CPT

## 2023-09-06 NOTE — HISTORY OF PRESENT ILLNESS
[de-identified] : This patient presents today for evaluation regarding complaints of a bump on the posterior aspect of the left elbow.  The area is localized to the proximal ulna approximately 5 cm distal to the olecranon tip.  Patient denies any trauma to the area.  She has noticed a prominence recently.  She is concerned that it could be a neoplasm.  She denies radicular symptoms or numbness and tingling.  She has no significant pain in the area.  Patient is able to perform all activities of daily living without restriction.
Refer to the Assessment tab to view/cancel completed assessment.

## 2023-09-06 NOTE — DISCUSSION/SUMMARY
[de-identified] : This patient presents today with complaints of a prominence about the left posterior elbow.  Her physical exam reveals slight prominence of the proximal ulna.  I do not think this represents anything but a slight prominence of the bone in that area.  No evidence of any neoplasm is noted on x-ray or by physical exam.  I recommend continued observation.  If she notes that the area increases in size or develops any pain in that area I would consider MRI at that point time.  She can resume normal activities.  At least 30 minutes was spent performing the evaluation and management on today's office visit.  This includes but is not limited to preparing to see patient including review of any test results or outside medical records, obtaining and/or reviewing separately obtained history, performing examination and evaluation, counseling and educating the patient on their diagnosis and treatment recommendations, ordering medications, tests, or procedures, documenting clinical information in the electronic health record, independently interpreting results (not separately reported) and communicating results to the patient, and coordination of care.

## 2023-09-06 NOTE — PHYSICAL EXAM
[de-identified] : The patient appears well nourished  and in no apparent distress.  The patient is alert and oriented to person, place, and time.   Affect and mood appear normal.    The head is normocephalic and atraumatic.  The eyes reveal normal sclera and extra ocular muscles are intact.   The neck appears normal with no jugular venous distention or masses noted.   Skin shows normal turgor with no evidence of eczema or psoriasis.  No respiratory distress noted.  The patient ambulates with a normal gait.  The  left elbow has full range of motion in flexion/extension, as well as pronation/supination.  There is a prominence of the ulnar crest approximately 5 cm distal to the olecranon tip which appears similar to the contralateral side but is slightly bigger than her right elbow.  No significant soft tissue masses are palpable.  There is no pain with range of motion.  There is no tenderness to palpation.    There is no warmth or erythema.  There is no instability to varus or valgus stress.  Elbow strength is normal.  Strength and sensation are intact distally.    Pulses and capillary refill are normal.    No clubbing, cyanosis, or edema noted.  No lymphadenopathy noted.  [de-identified] : AP,  lateral, and oblique views of the left elbow were obtained.  No bony lesions noted in the area of interest.  No periosteal reaction noted.  There is no evidence of fracture, subluxation, or dislocation of the elbow.  The joint spaces are well maintained without evidence of degenerative arthritis.  No osteochondral lesions are noted.

## 2023-10-05 ENCOUNTER — RX RENEWAL (OUTPATIENT)
Age: 73
End: 2023-10-05

## 2023-11-06 ENCOUNTER — APPOINTMENT (OUTPATIENT)
Dept: ORTHOPEDIC SURGERY | Facility: CLINIC | Age: 73
End: 2023-11-06

## 2023-11-15 ENCOUNTER — APPOINTMENT (OUTPATIENT)
Dept: ORTHOPEDIC SURGERY | Facility: CLINIC | Age: 73
End: 2023-11-15
Payer: MEDICARE

## 2023-11-15 DIAGNOSIS — M70.70 OTHER BURSITIS OF HIP, UNSPECIFIED HIP: ICD-10-CM

## 2023-11-15 PROCEDURE — 73562 X-RAY EXAM OF KNEE 3: CPT | Mod: RT

## 2023-11-15 PROCEDURE — 72110 X-RAY EXAM L-2 SPINE 4/>VWS: CPT

## 2023-11-15 PROCEDURE — 99214 OFFICE O/P EST MOD 30 MIN: CPT

## 2023-11-15 PROCEDURE — 72170 X-RAY EXAM OF PELVIS: CPT | Mod: 59

## 2023-11-16 ENCOUNTER — APPOINTMENT (OUTPATIENT)
Dept: MRI IMAGING | Facility: CLINIC | Age: 73
End: 2023-11-16
Payer: MEDICARE

## 2023-11-16 PROCEDURE — 72195 MRI PELVIS W/O DYE: CPT

## 2023-11-21 RX ORDER — DICLOFENAC SODIUM 3 G/100G
3 GEL TOPICAL TWICE DAILY
Qty: 1 | Refills: 0 | Status: ACTIVE | COMMUNITY
Start: 2023-11-15

## 2023-12-05 PROBLEM — R06.02 SOB (SHORTNESS OF BREATH): Status: ACTIVE | Noted: 2022-04-04

## 2023-12-05 PROBLEM — G47.33 OSA (OBSTRUCTIVE SLEEP APNEA): Status: ACTIVE | Noted: 2022-04-04

## 2023-12-06 ENCOUNTER — APPOINTMENT (OUTPATIENT)
Dept: ORTHOPEDIC SURGERY | Facility: CLINIC | Age: 73
End: 2023-12-06
Payer: MEDICARE

## 2023-12-06 ENCOUNTER — APPOINTMENT (OUTPATIENT)
Dept: CARDIOLOGY | Facility: CLINIC | Age: 73
End: 2023-12-06
Payer: MEDICARE

## 2023-12-06 ENCOUNTER — NON-APPOINTMENT (OUTPATIENT)
Age: 73
End: 2023-12-06

## 2023-12-06 VITALS
OXYGEN SATURATION: 98 % | BODY MASS INDEX: 24.55 KG/M2 | WEIGHT: 162 LBS | DIASTOLIC BLOOD PRESSURE: 84 MMHG | HEIGHT: 68 IN | HEART RATE: 55 BPM | SYSTOLIC BLOOD PRESSURE: 175 MMHG

## 2023-12-06 VITALS
SYSTOLIC BLOOD PRESSURE: 155 MMHG | BODY MASS INDEX: 24.4 KG/M2 | HEART RATE: 54 BPM | DIASTOLIC BLOOD PRESSURE: 80 MMHG | WEIGHT: 161 LBS | HEIGHT: 68 IN

## 2023-12-06 DIAGNOSIS — R00.1 BRADYCARDIA, UNSPECIFIED: ICD-10-CM

## 2023-12-06 DIAGNOSIS — G47.33 OBSTRUCTIVE SLEEP APNEA (ADULT) (PEDIATRIC): ICD-10-CM

## 2023-12-06 DIAGNOSIS — M16.11 UNILATERAL PRIMARY OSTEOARTHRITIS, RIGHT HIP: ICD-10-CM

## 2023-12-06 DIAGNOSIS — E78.5 HYPERLIPIDEMIA, UNSPECIFIED: ICD-10-CM

## 2023-12-06 DIAGNOSIS — I48.0 PAROXYSMAL ATRIAL FIBRILLATION: ICD-10-CM

## 2023-12-06 DIAGNOSIS — M51.37 OTHER INTERVERTEBRAL DISC DEGENERATION, LUMBOSACRAL REGION: ICD-10-CM

## 2023-12-06 DIAGNOSIS — R06.02 SHORTNESS OF BREATH: ICD-10-CM

## 2023-12-06 DIAGNOSIS — M41.50 OTHER SECONDARY SCOLIOSIS, SITE UNSPECIFIED: ICD-10-CM

## 2023-12-06 DIAGNOSIS — M76.899 OTHER SPECIFIED ENTHESOPATHIES OF UNSPECIFIED LOWER LIMB, EXCLUDING FOOT: ICD-10-CM

## 2023-12-06 PROCEDURE — 99214 OFFICE O/P EST MOD 30 MIN: CPT

## 2023-12-06 PROCEDURE — 99215 OFFICE O/P EST HI 40 MIN: CPT

## 2023-12-06 PROCEDURE — 93000 ELECTROCARDIOGRAM COMPLETE: CPT

## 2023-12-06 RX ORDER — TRAZODONE HYDROCHLORIDE 50 MG/1
50 TABLET ORAL
Refills: 0 | Status: ACTIVE | COMMUNITY

## 2024-05-09 LAB
ALBUMIN SERPL ELPH-MCNC: 4.3 G/DL
ALP BLD-CCNC: 86 U/L
ALT SERPL-CCNC: 20 U/L
ANION GAP SERPL CALC-SCNC: 12 MMOL/L
AST SERPL-CCNC: 25 U/L
BILIRUB SERPL-MCNC: 0.4 MG/DL
BUN SERPL-MCNC: 17 MG/DL
CALCIUM SERPL-MCNC: 9.2 MG/DL
CHLORIDE SERPL-SCNC: 104 MMOL/L
CHOLEST SERPL-MCNC: 211 MG/DL
CO2 SERPL-SCNC: 26 MMOL/L
CREAT SERPL-MCNC: 0.92 MG/DL
EGFR: 67 ML/MIN/1.73M2
ESTIMATED AVERAGE GLUCOSE: 111 MG/DL
GLUCOSE SERPL-MCNC: 104 MG/DL
HBA1C MFR BLD HPLC: 5.5 %
HCT VFR BLD CALC: 39.4 %
HDLC SERPL-MCNC: 90 MG/DL
HGB BLD-MCNC: 12.9 G/DL
LDLC SERPL CALC-MCNC: 99 MG/DL
MCHC RBC-ENTMCNC: 31.6 PG
MCHC RBC-ENTMCNC: 32.7 GM/DL
MCV RBC AUTO: 96.6 FL
NONHDLC SERPL-MCNC: 121 MG/DL
NT-PROBNP SERPL-MCNC: 307 PG/ML
PLATELET # BLD AUTO: 293 K/UL
POTASSIUM SERPL-SCNC: 5.3 MMOL/L
PROT SERPL-MCNC: 6.5 G/DL
RBC # BLD: 4.08 M/UL
RBC # FLD: 13.5 %
SODIUM SERPL-SCNC: 142 MMOL/L
TRIGL SERPL-MCNC: 127 MG/DL
TSH SERPL-ACNC: 1.7 UIU/ML
WBC # FLD AUTO: 5.88 K/UL

## 2024-05-15 ENCOUNTER — APPOINTMENT (OUTPATIENT)
Dept: CARDIOLOGY | Facility: CLINIC | Age: 74
End: 2024-05-15

## 2024-05-31 RX ORDER — RIVAROXABAN 20 MG/1
20 TABLET, FILM COATED ORAL
Qty: 90 | Refills: 1 | Status: ACTIVE | COMMUNITY
Start: 2021-07-19 | End: 1900-01-01

## 2024-06-03 RX ORDER — METOPROLOL SUCCINATE 25 MG/1
25 TABLET, EXTENDED RELEASE ORAL DAILY
Qty: 45 | Refills: 1 | Status: ACTIVE | COMMUNITY
Start: 1900-01-01 | End: 1900-01-01

## 2024-07-15 ENCOUNTER — APPOINTMENT (OUTPATIENT)
Dept: CARDIOLOGY | Facility: CLINIC | Age: 74
End: 2024-07-15
Payer: MEDICARE

## 2024-07-15 ENCOUNTER — NON-APPOINTMENT (OUTPATIENT)
Age: 74
End: 2024-07-15

## 2024-07-15 VITALS
RESPIRATION RATE: 18 BRPM | HEART RATE: 61 BPM | OXYGEN SATURATION: 95 % | BODY MASS INDEX: 24.25 KG/M2 | DIASTOLIC BLOOD PRESSURE: 79 MMHG | SYSTOLIC BLOOD PRESSURE: 138 MMHG | WEIGHT: 160 LBS | HEIGHT: 68 IN

## 2024-07-15 DIAGNOSIS — I95.1 ORTHOSTATIC HYPOTENSION: ICD-10-CM

## 2024-07-15 DIAGNOSIS — E78.5 HYPERLIPIDEMIA, UNSPECIFIED: ICD-10-CM

## 2024-07-15 DIAGNOSIS — I48.0 PAROXYSMAL ATRIAL FIBRILLATION: ICD-10-CM

## 2024-07-15 PROCEDURE — G2211 COMPLEX E/M VISIT ADD ON: CPT

## 2024-07-15 PROCEDURE — 99215 OFFICE O/P EST HI 40 MIN: CPT

## 2024-07-15 PROCEDURE — 93000 ELECTROCARDIOGRAM COMPLETE: CPT

## 2024-07-15 PROCEDURE — 93306 TTE W/DOPPLER COMPLETE: CPT

## 2024-10-14 NOTE — ED PROVIDER NOTE - PROGRESS NOTE DETAILS
Incoming refill request on patient's medication:    No protocol for requested medication.    Medication: Outpatient Medication Detail     Disp Refills Start End    amphetamine-dextroamphetamine (Adderall XR) 30 MG 24 hr capsule 30 capsule 0 9/13/2024 10/13/2024    Sig - Route: Take 1 capsule by mouth daily      Last office visit date: 9/19/24    Pharmacy: [facility] #[id number] - [place], [place] - [address]    Order pended, routed to clinician for review.     Prescriber's Follow Up Recommendation:  She will continue Adderall XR to 30 mg daily      Next Visit Date: 12/20/2024    CONTROLLED:  Last PDMP Rx Dispense date (Sold date, if available): 9/13/24  PDMP Alerts: 0  Last Drug Screen Date: ordered-not obtained  Last Signed Controlled Substance Agreement Date: 9/23/24        Dr. Sánchez in contact with cardiology team and Dr. Jaffe. - Lynette Ridley PA-C Patient in NSR, EKG repeated. - Lynette Ridley PA-C Dr. Sánchez d/w cardiology, plan for d/c to f/u in office, no change in home meds. - JENNIFER LamarC

## 2024-11-03 NOTE — ASU DISCHARGE PLAN (ADULT/PEDIATRIC). - C. MAKE IMPORTANT PERSONAL OR BUSINESS DECISIONS
EPAT - Social Work Psychiatric Assessment    Arrival Details  Mode of Arrival: Ambulance  Admission Source: Home  Admission Type: Voluntary  EPAT Assessment Start Date: 11/02/24  EPAT Assessment Start Time: 1946  Name of : Shanta IRAHETA    History of Present Illness  Admission Reason: Suicidal Ideation  HPI: 60 y/o  male seen via telehealth at [facility] for a psychiatric evaluation. He was brought in by EMS after a call was placed due to pt sharing he was struggling with suicidal ideation with a plan. History of anxiety and depression, currently being monitored by PCP for med management. He denies any drug of alcohol usage, UDS confirmed his reports. Admits to suicidal ideation for the last month with is progressively getting worse over the last few days. Pt started a new job a month ago and it has caused an increase of anxiety and depression. Pt did have a firearm in the home, wife removed today. No history of psychiatric placement. Denies hallucinations, delusions or paranoia.     SW Readmission Information   Readmission within 30 Days: No    Psychiatric Symptoms  Anxiety Symptoms: Generalized, Panic attack, Feelings of doom  Depression Symptoms: Appetite change, Change in energy level, Feelings of helplessness, Feelings of hopelessess, Impaired concentration, Sleep disturbance  Tamela Symptoms: No problems reported or observed.    Psychosis Symptoms  Hallucination Type: No problems reported or observed.  Delusion Type: No problems reported or observed.    Additional Symptoms - Adult  Generalized Anxiety Disorder: Difficult to control worry, Difficulity concentrating, Restlessness, Sleep disturbance  Obsessive Compulsive Disorder: Intrusive thoughts, Repetitive thoughts, Ruminatory thoughts  Panic Attack: Sweaty/clammy  Post Traumatic Stress Disorder: No problems reported or observed.  Delirium: No problems reported or observed.  Review of Symptoms Comments: Pt reports an icnrease of anxiety and  depression in the last month since starting new employment.    Past Psychiatric History/Meds/Treatments  Past Psychiatric History: Hx of depression and anxiety  Past Psychiatric Meds/Treatments: Paxcil  Past Violence/Victimization History: Denies    Current Mental Health Contacts   Name/Phone Number: -  Provider Name/Phone Number: -  Provider Last Appointment Date: -    Support System: Immediate family    Living Arrangement: House    Home Safety  Feels Safe Living in Home: No  Potentially Unsafe Housing Conditions: Unable to Assess  Home Safety : Does not feel safe with self in the home    Income Information  Employment Status for: Patient  Employment Status: Employed  Income Source: Employed  Current/Previous Occupation: Service Industry  Shift Worked: First Shift, Weekends  Income/Expense Information: Income meets expenses  Financial Concerns: None  Who Manages Finances if Patient Unable: Wife  Employment/ Finance Comments: Worried if he losses his job family will loss home    [employer] Service/Education History  Current or Previous  Service: None    Social/Cultural History  Social History: Denies  Cultural Requests During Hospitalization: Denies  Spiritual Requests During Hospitalization: Denies    Legal  Legal Considerations: Patient/ Family Ability to Make Healthcare Decisions    Drug Screening  Have you used any substances (canabis, cocaine, heroin, hallucinogens, inhalants, etc.) in the past 12 months?: No  Have you used any prescription drugs other than prescribed in the past 12 months?: No  Is a toxicology screen needed?: Yes         Behavioral Health  Affect: Appropriate to circumstances  Parent/Guardian/Significant Other Involvement: Attentive to patient needs  Family Behaviors: Appropriate for situation, Calm, Cooperative  Visitor Behaviors: Appropriate for situation, Cooperative  Needs Expressed: Denies  Emotional Support Given: Reassure    Orientation  Orientation Level: Oriented  X4    General Appearance  Motor Activity: Freedom of movement  Speech Pattern: Slow  General Attitude: Attentive, Cooperative, Pleasant, Interested  Appearance/Hygiene: Unremarkable    Thought Process  Coherency: Circumstantial  Content: Blaming self  Perception: Not altered  Hallucination: None  Judgment/Insight: Impaired  Confusion: None  Cognition: Appropriate judgement, Appropriate safety awareness    Sleep Pattern  Sleep Pattern: Difficulty falling asleep, Disturbed/interrupted sleep    Risk Factors  Self Harm/Suicidal Ideation Plan: Run car off rode or shoot self with firearm  Previous Self Harm/Suicidal Plans: Denies  Risk Factors: Access to weapons, Presence of firearms in home, Major mental illness, Male  Description of Thoughts/Ideas Leaving Unit Now: History of depression    Violence Risk Assessment  Assessment of Violence: None noted  Thoughts of Harm to Others: No    Ability to Assess Risk Screen  Risk Screen - Ability to Assess: Able to be screened  Ask Suicide-Screening Questions  1. In the past few weeks, have you wished you were dead?: No  2. In the past few weeks, have you felt that you or your family would be better off if you were dead?: Yes  3. In the past week, have you been having thoughts about killing yourself?: Yes  4. Have you ever tried to kill yourself?: No  5. Are you having thoughts of killing yourself right now?: Yes  Describe your thoughts of killing yourself right now:: No  Calculated Risk Score: Imminent Risk  [place] Suicide Severity Rating Scale (Screener/Recent Self-Report)  1. Wish to be Dead (Past 1 Month): Yes  2. Non-Specific Active Suicidal Thoughts (Past 1 Month): No  3. Active Suicidal Ideation with any Methods (Not Plan) Without Intent to Act (Past 1 Month): Yes  4. Active Suicidal Ideation with Some Intent to Act, Without Specific Plan (Past 1 Month): No  5. Active Suicidal Ideation with Specific Plan and Intent (Past 1 Month): No  6. Suicidal Behavior (Lifetime):  No  Calculated C-SSRS Risk Score (Lifetime/Recent): Moderate Risk  Step 1: Risk Factors  Current & Past Psychiatric Dx: Recent onset  Presenting Symptoms: Hopelessness or despair, Anxiety and/or panic  Family History: Suicide  Precipitants/Stressors: Triggering events leading to humiliation, shame, and/or despair (e.g. loss of relationship, financial or health status) (real or anticipated), Perceived burden on others  Change in Treatment: Non-compliant or not receiving treatment  Access to Lethal Methods : Yes  Step 2: Protective Factors   Protective Factors Internal: Identifies reasons for living, Fear of death or the actual act of killing self  Protective Factors External: Supportive social network or family or friends, Beloved pets  Step 3: Suicidal Ideation Intensity  Most Severe Suicidal Ideation Identified: Wanted to get firearm and shoot self  How Many Times Have You Had These Thoughts: Many times each day  When You Have the Thoughts How Long do They Last : 4-8 hours/most of the day  Could/Can You Stop Thinking About Killing Yourself or Wanting to Die if You Want to: Can control thoughts with a lot of difficulty  Are There Things - Anyone or Anything - That Stopped You From Wanting to Die or Acting on: Deterrents probably stopped you  What Sort of Reasons Did You Have For Thinking About Wanting to Die or Killing Yourself: Completely to end or stop the pain (you couldn't go on living with the pain or how you were feeling)  Total Score: 20  Step 5: Documentation  Risk Level: High suicide risk    Psychiatric Impression and Plan of Care  Assessment and Plan: 58 y/o male presents flat, engaged but somber. He shares that he does not feel safe with himself due to the increase of suicidal ideation over the last day. He reports at 3am in the morning he was having thoughts of getting his firearm and shooting himself. Pt explains that after starting his new job he feels overwhelmed. 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 At times he does not feel supported in learning everything but employer does appear to be patient with him. Pt feels he should know it by now and feels triggered before and after work. He is sleeping roughly 1-2 hours night and has loss 15lbs in the last 3 -4 weeks. “I can't eat or sleep .I don't feel safe, I don't want to hurt myself. I need help” He is chronically on edge and struggling with tremors and managing his racing thoughts. States he is compliant with medication but it does not help with reducing anxiety at this time.     Based on his current presentation and symptoms he is being recommended for psychiatric placement.  Provider agrees with the recommendation.     Dx: Depression   Specific Resources Provided to Patient: Hospitalization  CM Notified: No  PHP/IOP Recommended: No  Specific Information Provided for PHP/IOP: No  Plan Comments: Followup with provider after discharge    Outcome/Disposition  Patient's Perception of Outcome Achieved: He agrees with recommendation  Assessment, Recommendations and Risk Level Reviewed with: Dr. Aj Babcock  Contact Name: Deidre Arana  Contact Number(s): [phone]  Contact Relationship: Wife  EPAT Assessment Completed Date: 11/02/24  EPAT Assessment Completed Time: 2035

## 2024-11-26 ENCOUNTER — RX RENEWAL (OUTPATIENT)
Age: 74
End: 2024-11-26

## 2025-01-09 NOTE — ED PROVIDER NOTE - CROS ED SKIN ALL NEG
impaired balance/decreased strength
impaired balance/impaired postural control/decreased strength
negative...

## 2025-02-11 ENCOUNTER — APPOINTMENT (OUTPATIENT)
Dept: CARDIOLOGY | Facility: CLINIC | Age: 75
End: 2025-02-11
Payer: MEDICARE

## 2025-02-11 ENCOUNTER — NON-APPOINTMENT (OUTPATIENT)
Age: 75
End: 2025-02-11

## 2025-02-11 VITALS
OXYGEN SATURATION: 97 % | DIASTOLIC BLOOD PRESSURE: 80 MMHG | SYSTOLIC BLOOD PRESSURE: 140 MMHG | HEART RATE: 61 BPM | BODY MASS INDEX: 24.33 KG/M2 | WEIGHT: 160 LBS

## 2025-02-11 DIAGNOSIS — Z00.00 ENCOUNTER FOR GENERAL ADULT MEDICAL EXAMINATION W/OUT ABNORMAL FINDINGS: ICD-10-CM

## 2025-02-11 DIAGNOSIS — I48.0 PAROXYSMAL ATRIAL FIBRILLATION: ICD-10-CM

## 2025-02-11 DIAGNOSIS — E78.5 HYPERLIPIDEMIA, UNSPECIFIED: ICD-10-CM

## 2025-02-11 PROCEDURE — 93000 ELECTROCARDIOGRAM COMPLETE: CPT

## 2025-02-11 PROCEDURE — G2211 COMPLEX E/M VISIT ADD ON: CPT

## 2025-02-11 PROCEDURE — 99214 OFFICE O/P EST MOD 30 MIN: CPT

## 2025-02-11 PROCEDURE — G0537: CPT

## 2025-02-22 ENCOUNTER — TRANSCRIPTION ENCOUNTER (OUTPATIENT)
Age: 75
End: 2025-02-22

## 2025-02-24 ENCOUNTER — APPOINTMENT (OUTPATIENT)
Dept: CARDIOLOGY | Facility: CLINIC | Age: 75
End: 2025-02-24
Payer: MEDICARE

## 2025-02-24 ENCOUNTER — RX RENEWAL (OUTPATIENT)
Age: 75
End: 2025-02-24

## 2025-02-24 VITALS
HEART RATE: 56 BPM | WEIGHT: 160 LBS | SYSTOLIC BLOOD PRESSURE: 140 MMHG | DIASTOLIC BLOOD PRESSURE: 73 MMHG | OXYGEN SATURATION: 96 % | BODY MASS INDEX: 24.33 KG/M2

## 2025-02-24 DIAGNOSIS — I48.0 PAROXYSMAL ATRIAL FIBRILLATION: ICD-10-CM

## 2025-02-24 DIAGNOSIS — R00.2 PALPITATIONS: ICD-10-CM

## 2025-02-24 DIAGNOSIS — E78.5 HYPERLIPIDEMIA, UNSPECIFIED: ICD-10-CM

## 2025-02-24 PROCEDURE — 99214 OFFICE O/P EST MOD 30 MIN: CPT

## 2025-02-24 PROCEDURE — G2211 COMPLEX E/M VISIT ADD ON: CPT

## 2025-07-26 ENCOUNTER — NON-APPOINTMENT (OUTPATIENT)
Age: 75
End: 2025-07-26

## 2025-07-28 ENCOUNTER — NON-APPOINTMENT (OUTPATIENT)
Age: 75
End: 2025-07-28

## 2025-07-28 ENCOUNTER — APPOINTMENT (OUTPATIENT)
Dept: CARDIOLOGY | Facility: CLINIC | Age: 75
End: 2025-07-28
Payer: MEDICARE

## 2025-07-28 VITALS
WEIGHT: 156 LBS | HEART RATE: 58 BPM | OXYGEN SATURATION: 96 % | SYSTOLIC BLOOD PRESSURE: 134 MMHG | BODY MASS INDEX: 23.72 KG/M2 | DIASTOLIC BLOOD PRESSURE: 76 MMHG

## 2025-07-28 DIAGNOSIS — R00.2 PALPITATIONS: ICD-10-CM

## 2025-07-28 DIAGNOSIS — Z00.00 ENCOUNTER FOR GENERAL ADULT MEDICAL EXAMINATION W/OUT ABNORMAL FINDINGS: ICD-10-CM

## 2025-07-28 DIAGNOSIS — I95.1 ORTHOSTATIC HYPOTENSION: ICD-10-CM

## 2025-07-28 PROCEDURE — 93000 ELECTROCARDIOGRAM COMPLETE: CPT

## 2025-07-28 PROCEDURE — 99214 OFFICE O/P EST MOD 30 MIN: CPT

## 2025-07-28 PROCEDURE — G2211 COMPLEX E/M VISIT ADD ON: CPT

## 2025-08-05 ENCOUNTER — APPOINTMENT (OUTPATIENT)
Dept: CARDIOLOGY | Facility: CLINIC | Age: 75
End: 2025-08-05

## 2025-08-05 ENCOUNTER — APPOINTMENT (OUTPATIENT)
Dept: CARDIOLOGY | Facility: CLINIC | Age: 75
End: 2025-08-05
Payer: MEDICARE

## 2025-08-05 VITALS
WEIGHT: 156 LBS | DIASTOLIC BLOOD PRESSURE: 67 MMHG | BODY MASS INDEX: 23.72 KG/M2 | SYSTOLIC BLOOD PRESSURE: 120 MMHG | OXYGEN SATURATION: 96 % | HEART RATE: 103 BPM

## 2025-08-05 DIAGNOSIS — E78.5 HYPERLIPIDEMIA, UNSPECIFIED: ICD-10-CM

## 2025-08-05 DIAGNOSIS — I48.0 PAROXYSMAL ATRIAL FIBRILLATION: ICD-10-CM

## 2025-08-05 PROCEDURE — 99215 OFFICE O/P EST HI 40 MIN: CPT

## 2025-08-05 PROCEDURE — G2211 COMPLEX E/M VISIT ADD ON: CPT

## 2025-08-06 ENCOUNTER — OUTPATIENT (OUTPATIENT)
Dept: OUTPATIENT SERVICES | Facility: HOSPITAL | Age: 75
LOS: 1 days | End: 2025-08-06
Payer: MEDICARE

## 2025-08-06 ENCOUNTER — TRANSCRIPTION ENCOUNTER (OUTPATIENT)
Age: 75
End: 2025-08-06

## 2025-08-06 VITALS
SYSTOLIC BLOOD PRESSURE: 109 MMHG | OXYGEN SATURATION: 99 % | DIASTOLIC BLOOD PRESSURE: 77 MMHG | TEMPERATURE: 98 F | HEART RATE: 132 BPM | RESPIRATION RATE: 16 BRPM | WEIGHT: 154.98 LBS

## 2025-08-06 VITALS
RESPIRATION RATE: 14 BRPM | SYSTOLIC BLOOD PRESSURE: 121 MMHG | DIASTOLIC BLOOD PRESSURE: 92 MMHG | OXYGEN SATURATION: 100 % | HEART RATE: 58 BPM

## 2025-08-06 DIAGNOSIS — I48.91 UNSPECIFIED ATRIAL FIBRILLATION: ICD-10-CM

## 2025-08-06 DIAGNOSIS — D03.9 MELANOMA IN SITU, UNSPECIFIED: Chronic | ICD-10-CM

## 2025-08-06 PROCEDURE — 92960 CARDIOVERSION ELECTRIC EXT: CPT

## 2025-08-06 PROCEDURE — 93010 ELECTROCARDIOGRAM REPORT: CPT

## 2025-08-06 RX ORDER — RIVAROXABAN 10 MG/1
1 TABLET, FILM COATED ORAL
Qty: 90 | Refills: 0
Start: 2025-08-06 | End: 2025-11-03

## 2025-09-05 ENCOUNTER — APPOINTMENT (OUTPATIENT)
Dept: CARDIOLOGY | Facility: CLINIC | Age: 75
End: 2025-09-05
Payer: MEDICARE

## 2025-09-05 VITALS
HEART RATE: 124 BPM | DIASTOLIC BLOOD PRESSURE: 80 MMHG | WEIGHT: 150 LBS | SYSTOLIC BLOOD PRESSURE: 110 MMHG | OXYGEN SATURATION: 96 % | BODY MASS INDEX: 22.81 KG/M2

## 2025-09-05 DIAGNOSIS — E78.5 HYPERLIPIDEMIA, UNSPECIFIED: ICD-10-CM

## 2025-09-05 DIAGNOSIS — R00.2 PALPITATIONS: ICD-10-CM

## 2025-09-05 PROCEDURE — G2211 COMPLEX E/M VISIT ADD ON: CPT

## 2025-09-05 PROCEDURE — 99215 OFFICE O/P EST HI 40 MIN: CPT

## 2025-09-08 ENCOUNTER — NON-APPOINTMENT (OUTPATIENT)
Age: 75
End: 2025-09-08

## 2025-09-08 ENCOUNTER — APPOINTMENT (OUTPATIENT)
Dept: ELECTROPHYSIOLOGY | Facility: CLINIC | Age: 75
End: 2025-09-08
Payer: MEDICARE

## 2025-09-08 VITALS
HEIGHT: 68 IN | OXYGEN SATURATION: 100 % | BODY MASS INDEX: 23.04 KG/M2 | HEART RATE: 111 BPM | WEIGHT: 152 LBS | TEMPERATURE: 98.2 F | DIASTOLIC BLOOD PRESSURE: 69 MMHG | SYSTOLIC BLOOD PRESSURE: 127 MMHG

## 2025-09-08 DIAGNOSIS — I48.0 PAROXYSMAL ATRIAL FIBRILLATION: ICD-10-CM

## 2025-09-08 DIAGNOSIS — G47.33 OBSTRUCTIVE SLEEP APNEA (ADULT) (PEDIATRIC): ICD-10-CM

## 2025-09-08 DIAGNOSIS — R00.1 BRADYCARDIA, UNSPECIFIED: ICD-10-CM

## 2025-09-08 DIAGNOSIS — Z92.89 PERSONAL HISTORY OF OTHER MEDICAL TREATMENT: ICD-10-CM

## 2025-09-08 PROCEDURE — 99215 OFFICE O/P EST HI 40 MIN: CPT

## 2025-09-08 PROCEDURE — 93000 ELECTROCARDIOGRAM COMPLETE: CPT

## 2025-09-08 PROCEDURE — G2211 COMPLEX E/M VISIT ADD ON: CPT

## 2025-09-10 ENCOUNTER — TRANSCRIPTION ENCOUNTER (OUTPATIENT)
Age: 75
End: 2025-09-10